# Patient Record
Sex: FEMALE | Race: WHITE | NOT HISPANIC OR LATINO | Employment: OTHER | URBAN - METROPOLITAN AREA
[De-identification: names, ages, dates, MRNs, and addresses within clinical notes are randomized per-mention and may not be internally consistent; named-entity substitution may affect disease eponyms.]

---

## 2017-05-31 ENCOUNTER — APPOINTMENT (OUTPATIENT)
Dept: PREADMISSION TESTING | Facility: HOSPITAL | Age: 82
End: 2017-05-31

## 2017-05-31 VITALS
DIASTOLIC BLOOD PRESSURE: 70 MMHG | SYSTOLIC BLOOD PRESSURE: 147 MMHG | HEIGHT: 62 IN | RESPIRATION RATE: 20 BRPM | WEIGHT: 131 LBS | TEMPERATURE: 96.9 F | OXYGEN SATURATION: 99 % | HEART RATE: 63 BPM | BODY MASS INDEX: 24.11 KG/M2

## 2017-05-31 LAB
ANION GAP SERPL CALCULATED.3IONS-SCNC: 12.7 MMOL/L
BUN BLD-MCNC: 16 MG/DL (ref 8–23)
BUN/CREAT SERPL: 17.2 (ref 7–25)
CALCIUM SPEC-SCNC: 9.3 MG/DL (ref 8.6–10.5)
CHLORIDE SERPL-SCNC: 101 MMOL/L (ref 98–107)
CO2 SERPL-SCNC: 26.3 MMOL/L (ref 22–29)
CREAT BLD-MCNC: 0.93 MG/DL (ref 0.57–1)
DEPRECATED RDW RBC AUTO: 48.8 FL (ref 37–54)
ERYTHROCYTE [DISTWIDTH] IN BLOOD BY AUTOMATED COUNT: 14.8 % (ref 11.7–13)
GFR SERPL CREATININE-BSD FRML MDRD: 58 ML/MIN/1.73
GLUCOSE BLD-MCNC: 82 MG/DL (ref 65–99)
HCT VFR BLD AUTO: 36.4 % (ref 35.6–45.5)
HGB BLD-MCNC: 11.9 G/DL (ref 11.9–15.5)
MCH RBC QN AUTO: 29.5 PG (ref 26.9–32)
MCHC RBC AUTO-ENTMCNC: 32.7 G/DL (ref 32.4–36.3)
MCV RBC AUTO: 90.3 FL (ref 80.5–98.2)
PLATELET # BLD AUTO: 173 10*3/MM3 (ref 140–500)
PMV BLD AUTO: 10.8 FL (ref 6–12)
POTASSIUM BLD-SCNC: 4.6 MMOL/L (ref 3.5–5.2)
RBC # BLD AUTO: 4.03 10*6/MM3 (ref 3.9–5.2)
SODIUM BLD-SCNC: 140 MMOL/L (ref 136–145)
WBC NRBC COR # BLD: 5.77 10*3/MM3 (ref 4.5–10.7)

## 2017-05-31 PROCEDURE — 93010 ELECTROCARDIOGRAM REPORT: CPT | Performed by: INTERNAL MEDICINE

## 2017-05-31 PROCEDURE — 85027 COMPLETE CBC AUTOMATED: CPT | Performed by: OBSTETRICS & GYNECOLOGY

## 2017-05-31 PROCEDURE — 36415 COLL VENOUS BLD VENIPUNCTURE: CPT

## 2017-05-31 PROCEDURE — 80048 BASIC METABOLIC PNL TOTAL CA: CPT | Performed by: OBSTETRICS & GYNECOLOGY

## 2017-05-31 PROCEDURE — 93005 ELECTROCARDIOGRAM TRACING: CPT

## 2017-05-31 RX ORDER — POTASSIUM CHLORIDE 750 MG/1
10 TABLET, FILM COATED, EXTENDED RELEASE ORAL AS NEEDED
COMMUNITY
End: 2020-09-04 | Stop reason: SDUPTHER

## 2017-05-31 RX ORDER — ATORVASTATIN CALCIUM 20 MG/1
20 TABLET, FILM COATED ORAL NIGHTLY
COMMUNITY
End: 2020-02-18 | Stop reason: SDDI

## 2017-06-06 ENCOUNTER — ANESTHESIA (OUTPATIENT)
Dept: PERIOP | Facility: HOSPITAL | Age: 82
End: 2017-06-06

## 2017-06-06 ENCOUNTER — ANESTHESIA EVENT (OUTPATIENT)
Dept: PERIOP | Facility: HOSPITAL | Age: 82
End: 2017-06-06

## 2017-06-06 ENCOUNTER — HOSPITAL ENCOUNTER (OUTPATIENT)
Facility: HOSPITAL | Age: 82
Setting detail: HOSPITAL OUTPATIENT SURGERY
Discharge: HOME | End: 2017-06-06
Attending: OBSTETRICS & GYNECOLOGY | Admitting: OBSTETRICS & GYNECOLOGY

## 2017-06-06 VITALS
SYSTOLIC BLOOD PRESSURE: 142 MMHG | WEIGHT: 129.31 LBS | DIASTOLIC BLOOD PRESSURE: 67 MMHG | RESPIRATION RATE: 16 BRPM | TEMPERATURE: 97.9 F | BODY MASS INDEX: 22.91 KG/M2 | HEART RATE: 69 BPM | HEIGHT: 63 IN | OXYGEN SATURATION: 100 %

## 2017-06-06 DIAGNOSIS — N71.0: ICD-10-CM

## 2017-06-06 PROCEDURE — 25010000002 DEXAMETHASONE PER 1 MG: Performed by: NURSE ANESTHETIST, CERTIFIED REGISTERED

## 2017-06-06 PROCEDURE — 25010000002 MIDAZOLAM PER 1 MG

## 2017-06-06 PROCEDURE — 25010000002 ONDANSETRON PER 1 MG: Performed by: NURSE ANESTHETIST, CERTIFIED REGISTERED

## 2017-06-06 PROCEDURE — 25010000002 FENTANYL CITRATE (PF) 100 MCG/2ML SOLUTION

## 2017-06-06 PROCEDURE — 25010000002 FENTANYL CITRATE (PF) 100 MCG/2ML SOLUTION: Performed by: NURSE ANESTHETIST, CERTIFIED REGISTERED

## 2017-06-06 PROCEDURE — 88305 TISSUE EXAM BY PATHOLOGIST: CPT | Performed by: OBSTETRICS & GYNECOLOGY

## 2017-06-06 RX ORDER — MIDAZOLAM HYDROCHLORIDE 1 MG/ML
INJECTION INTRAMUSCULAR; INTRAVENOUS
Status: COMPLETED
Start: 2017-06-06 | End: 2017-06-06

## 2017-06-06 RX ORDER — PROMETHAZINE HYDROCHLORIDE 25 MG/1
25 SUPPOSITORY RECTAL ONCE AS NEEDED
Status: DISCONTINUED | OUTPATIENT
Start: 2017-06-06 | End: 2017-06-06 | Stop reason: HOSPADM

## 2017-06-06 RX ORDER — LABETALOL HYDROCHLORIDE 5 MG/ML
5 INJECTION, SOLUTION INTRAVENOUS
Status: DISCONTINUED | OUTPATIENT
Start: 2017-06-06 | End: 2017-06-06 | Stop reason: HOSPADM

## 2017-06-06 RX ORDER — FENTANYL CITRATE 50 UG/ML
50 INJECTION, SOLUTION INTRAMUSCULAR; INTRAVENOUS
Status: DISCONTINUED | OUTPATIENT
Start: 2017-06-06 | End: 2017-06-06 | Stop reason: HOSPADM

## 2017-06-06 RX ORDER — PROMETHAZINE HYDROCHLORIDE 25 MG/1
25 TABLET ORAL ONCE AS NEEDED
Status: DISCONTINUED | OUTPATIENT
Start: 2017-06-06 | End: 2017-06-06 | Stop reason: HOSPADM

## 2017-06-06 RX ORDER — PROMETHAZINE HYDROCHLORIDE 25 MG/ML
12.5 INJECTION, SOLUTION INTRAMUSCULAR; INTRAVENOUS ONCE AS NEEDED
Status: DISCONTINUED | OUTPATIENT
Start: 2017-06-06 | End: 2017-06-06 | Stop reason: HOSPADM

## 2017-06-06 RX ORDER — FAMOTIDINE 10 MG/ML
20 INJECTION, SOLUTION INTRAVENOUS ONCE
Status: COMPLETED | OUTPATIENT
Start: 2017-06-06 | End: 2017-06-06

## 2017-06-06 RX ORDER — FAMOTIDINE 10 MG/ML
INJECTION, SOLUTION INTRAVENOUS
Status: COMPLETED
Start: 2017-06-06 | End: 2017-06-06

## 2017-06-06 RX ORDER — NALOXONE HCL 0.4 MG/ML
0.2 VIAL (ML) INJECTION AS NEEDED
Status: DISCONTINUED | OUTPATIENT
Start: 2017-06-06 | End: 2017-06-06 | Stop reason: HOSPADM

## 2017-06-06 RX ORDER — FLUMAZENIL 0.1 MG/ML
0.2 INJECTION INTRAVENOUS AS NEEDED
Status: DISCONTINUED | OUTPATIENT
Start: 2017-06-06 | End: 2017-06-06 | Stop reason: HOSPADM

## 2017-06-06 RX ORDER — SODIUM CHLORIDE, SODIUM LACTATE, POTASSIUM CHLORIDE, CALCIUM CHLORIDE 600; 310; 30; 20 MG/100ML; MG/100ML; MG/100ML; MG/100ML
9 INJECTION, SOLUTION INTRAVENOUS CONTINUOUS
Status: DISCONTINUED | OUTPATIENT
Start: 2017-06-06 | End: 2017-06-06 | Stop reason: HOSPADM

## 2017-06-06 RX ORDER — OXYCODONE AND ACETAMINOPHEN 7.5; 325 MG/1; MG/1
1 TABLET ORAL ONCE AS NEEDED
Status: DISCONTINUED | OUTPATIENT
Start: 2017-06-06 | End: 2017-06-06 | Stop reason: HOSPADM

## 2017-06-06 RX ORDER — HYDRALAZINE HYDROCHLORIDE 20 MG/ML
5 INJECTION INTRAMUSCULAR; INTRAVENOUS
Status: DISCONTINUED | OUTPATIENT
Start: 2017-06-06 | End: 2017-06-06 | Stop reason: HOSPADM

## 2017-06-06 RX ORDER — MAGNESIUM HYDROXIDE 1200 MG/15ML
LIQUID ORAL AS NEEDED
Status: DISCONTINUED | OUTPATIENT
Start: 2017-06-06 | End: 2017-06-06 | Stop reason: HOSPADM

## 2017-06-06 RX ORDER — MIDAZOLAM HYDROCHLORIDE 1 MG/ML
1 INJECTION INTRAMUSCULAR; INTRAVENOUS
Status: DISCONTINUED | OUTPATIENT
Start: 2017-06-06 | End: 2017-06-06 | Stop reason: HOSPADM

## 2017-06-06 RX ORDER — FENTANYL CITRATE 50 UG/ML
INJECTION, SOLUTION INTRAMUSCULAR; INTRAVENOUS
Status: COMPLETED
Start: 2017-06-06 | End: 2017-06-06

## 2017-06-06 RX ORDER — ONDANSETRON 2 MG/ML
4 INJECTION INTRAMUSCULAR; INTRAVENOUS ONCE AS NEEDED
Status: DISCONTINUED | OUTPATIENT
Start: 2017-06-06 | End: 2017-06-06 | Stop reason: HOSPADM

## 2017-06-06 RX ORDER — DIPHENHYDRAMINE HYDROCHLORIDE 50 MG/ML
12.5 INJECTION INTRAMUSCULAR; INTRAVENOUS
Status: DISCONTINUED | OUTPATIENT
Start: 2017-06-06 | End: 2017-06-06 | Stop reason: HOSPADM

## 2017-06-06 RX ORDER — DEXAMETHASONE SODIUM PHOSPHATE 10 MG/ML
INJECTION INTRAMUSCULAR; INTRAVENOUS AS NEEDED
Status: DISCONTINUED | OUTPATIENT
Start: 2017-06-06 | End: 2017-06-06 | Stop reason: SURG

## 2017-06-06 RX ORDER — ONDANSETRON 2 MG/ML
INJECTION INTRAMUSCULAR; INTRAVENOUS AS NEEDED
Status: DISCONTINUED | OUTPATIENT
Start: 2017-06-06 | End: 2017-06-06 | Stop reason: SURG

## 2017-06-06 RX ORDER — SODIUM CHLORIDE 0.9 % (FLUSH) 0.9 %
1-10 SYRINGE (ML) INJECTION AS NEEDED
Status: DISCONTINUED | OUTPATIENT
Start: 2017-06-06 | End: 2017-06-06 | Stop reason: HOSPADM

## 2017-06-06 RX ORDER — HYDROCODONE BITARTRATE AND ACETAMINOPHEN 7.5; 325 MG/1; MG/1
1 TABLET ORAL ONCE AS NEEDED
Status: DISCONTINUED | OUTPATIENT
Start: 2017-06-06 | End: 2017-06-06 | Stop reason: HOSPADM

## 2017-06-06 RX ORDER — EPHEDRINE SULFATE 50 MG/ML
5 INJECTION, SOLUTION INTRAVENOUS ONCE AS NEEDED
Status: DISCONTINUED | OUTPATIENT
Start: 2017-06-06 | End: 2017-06-06 | Stop reason: HOSPADM

## 2017-06-06 RX ORDER — PROMETHAZINE HYDROCHLORIDE 25 MG/1
12.5 TABLET ORAL ONCE AS NEEDED
Status: DISCONTINUED | OUTPATIENT
Start: 2017-06-06 | End: 2017-06-06 | Stop reason: HOSPADM

## 2017-06-06 RX ORDER — MIDAZOLAM HYDROCHLORIDE 1 MG/ML
2 INJECTION INTRAMUSCULAR; INTRAVENOUS
Status: DISCONTINUED | OUTPATIENT
Start: 2017-06-06 | End: 2017-06-06 | Stop reason: HOSPADM

## 2017-06-06 RX ADMIN — MIDAZOLAM 0.5 MG: 1 INJECTION INTRAMUSCULAR; INTRAVENOUS at 12:28

## 2017-06-06 RX ADMIN — FAMOTIDINE 20 MG: 10 INJECTION, SOLUTION INTRAVENOUS at 12:29

## 2017-06-06 RX ADMIN — DEXAMETHASONE SODIUM PHOSPHATE 6 MG: 10 INJECTION INTRAMUSCULAR; INTRAVENOUS at 14:42

## 2017-06-06 RX ADMIN — FENTANYL CITRATE 50 MCG: 50 INJECTION, SOLUTION INTRAMUSCULAR; INTRAVENOUS at 15:55

## 2017-06-06 RX ADMIN — SODIUM CHLORIDE, POTASSIUM CHLORIDE, SODIUM LACTATE AND CALCIUM CHLORIDE 9 ML/HR: 600; 310; 30; 20 INJECTION, SOLUTION INTRAVENOUS at 12:28

## 2017-06-06 RX ADMIN — FENTANYL CITRATE 25 MCG: 50 INJECTION, SOLUTION INTRAMUSCULAR; INTRAVENOUS at 15:45

## 2017-06-06 RX ADMIN — ONDANSETRON 4 MG: 2 INJECTION INTRAMUSCULAR; INTRAVENOUS at 14:42

## 2017-06-06 RX ADMIN — MIDAZOLAM HYDROCHLORIDE 0.5 MG: 1 INJECTION INTRAMUSCULAR; INTRAVENOUS at 12:28

## 2017-06-06 NOTE — OP NOTE
Date of Surgery:  June 6, 2017    Surgeon:  Andres Lambert MD    Assistants:    1.  Yamila Chen MD (Fellow)  2.  Billy Mccullough (medical student)    Preoperative Diagnosis:  Acute pyometrea    Postoperative Diagnosis:  Atrophic endometrial cavity    Procedures performed:  1.  Diagnostic hysteroscopy  2.  Dilation and curettage    Anesthesia:  Laryngeal mask    Estimated blood loss:  5 mL    Urine Output:  150 mL    Fluids:  650 mL    Findings:    1. Vulvovaginal atrophy  2. Size 2 Gelhorn pessary in the vagina  3. Vaginal erosion at the right vaginal fornix  4. Cervical ecchymosis   5. 3 mm urethral caruncle  6. Vaginal petechia  7. Atrophic endometrium without any mass or foreign material in the uterus and no purulent material visualized or obtain with curettings    Specimen:  Endometrial curettings    Implants:  None    Complications:  None apparent    Disposition:  Extubated and transferred to recovery in stable condition.    Indications for procedure:  83-year-old female who had postmenopausal bleeding and subsequently had an endometrial biopsy, which showed acute purulent material.    Description of the procedure:  Patient's consent reviewed in preop area.  No preoperative antibiotic was indicated for this procedure.  Sequential compresion devices were on and running prior to induction of anesthesia. Time out was performed in OR and patient underwent induction of anesthesia and laryngeal mask was placed without difficulty and was placed in the mid dorsal lithotomy position with legs in Candy cane stirrups and care taken not to overflex or overextend hips and shoulders.     Operative technique:  The lateral speculum was then inserted into the vagina.  The anterior lip of the cervix was visualized and grasped using a single-tooth tenaculum.  The cervix was adequately dilated using Shabazz dilators for the introduction of the hysteroscope.  The lateral speculum was removed.  The hysteroscope was introduced  under direct visulization using normal saline solution as the distending medium.  The hysteroscope was advance to the fundus and the entire uterine cavity was surveyed with the findings noted above.  The hysteroscope was then removed and a sharp curetting was performed.  The was done by starting at the 12 o'clock position and rotating a total of 360 degrees in order to cover all surfaces of the endometrial cavity.  Endometrial tissue was obtained and was sent to pathology.  Following the curetting, good hemostasis was noted.  The single-tooth tenaculum was removed from the anterior lip of the cervix and hemostasis was also noted at the tenaculum puncture sites.      At the end of the procedure, all needle, sponge, and instrument counts were noted to be correct x 2.  The patient tolerated the procedure well.  She was returned to supine position.  The laryngeal mask was removed.  She was transferred to the recovery room in stable condition.  Dr. Andres Lambert was present and participated in the entire procedure.

## 2017-06-06 NOTE — PLAN OF CARE
Problem: Patient Care Overview (Adult)  Goal: Plan of Care Review  Outcome: Outcome(s) achieved Date Met:  06/06/17 06/06/17 1739   Coping/Psychosocial Response Interventions   Plan Of Care Reviewed With patient;kathi   Patient Care Overview   Progress improving   Outcome Evaluation   Outcome Summary/Follow up Plan ready for discharge

## 2017-06-06 NOTE — BRIEF OP NOTE
DILATATION AND CURETTAGE HYSTEROSCOPY  Procedure Note    Isabel Jacques  6/6/2017    Pre-op Diagnosis:   Acute pyometrea    Post-op Diagnosis:     Acute pyometrea    Procedure/CPT® Codes:      Procedure(s):  DILATATION AND CURETTAGE HYSTEROSCOPY    Surgeon(s):  Andres Lambert MD    Anesthesia: General    Staff:   Circulator: Nena Salazar RN  Scrub Person: Silvano Adames    Estimated Blood Loss: 5 mL  Urine Voided: 150 mL    Specimens:                  ID Type Source Tests Collected by Time Destination   A :  Tissue Endometrial Curettings TISSUE EXAM Andres Lambert MD 6/6/2017 1507          Drains:    None       Findings:   1.  Vulvovaginal atrophy  2.  Size 2 Gelhorn pessary  3.  Vaginal erosion at the right vaginal fornix  4.  Cervical ecchymosis   5.  3 mm urethral caruncle  6.  Vaginal petechia  7.  Atrophy endometrium without any mass or foreign material in the uterus    Complications: None apparent      Yamila Chen MD     Date: 6/6/2017  Time: 3:19 PM

## 2017-06-06 NOTE — ANESTHESIA PROCEDURE NOTES
Airway  Urgency: elective    Date/Time: 6/6/2017 2:29 PM  Airway not difficult    General Information and Staff    Patient location during procedure: OR  Anesthesiologist: LILIYA JOYCE  CRNA: MILVIA FALCON    Indications and Patient Condition  Indications for airway management: airway protection    Preoxygenated: yes  Mask difficulty assessment: 1 - vent by mask    Final Airway Details  Final airway type: supraglottic airway      Successful airway: classic  Size 4    Number of attempts at approach: 1    Additional Comments  Smooth IV induction. LMA inserted with ease. Cuff up. LMA secured BEBS

## 2017-06-06 NOTE — PLAN OF CARE
Problem: Patient Care Overview (Adult)  Goal: Discharge Needs Assessment  Outcome: Outcome(s) achieved Date Met:  06/06/17 06/06/17 8048   Discharge Needs Assessment   Concerns To Be Addressed no discharge needs identified

## 2017-06-06 NOTE — H&P
Chief complaint:  Purulent material in the uterus    History of present illness:  83-year-old with acute purulent material on endometrial biopsy.    Review of Systems - General ROS: negative  Psychological ROS: negative  Respiratory ROS: no cough, shortness of breath, or wheezing  Cardiovascular ROS: no chest pain or dyspnea on exertion  Gastrointestinal ROS: no abdominal pain, change in bowel habits, or black or bloody stools  negative  Genito-Urinary ROS: no dysuria, trouble voiding, or hematuria  Musculoskeletal ROS: negative  Neurological ROS: no TIA or stroke symptoms    Past Medical History:   Diagnosis Date   • Atrial fibrillation and flutter    • Cellulitis     LOWER EXTREMITIES   • Female bladder prolapse    • High cholesterol    • History of cellulitis    • History of UTI    • Hypertension    • Irregular heart beat    • Macular degeneration    • Presence of pessary    • Vagina bleeding    • Vaginal prolapse      Past Surgical History:   Procedure Laterality Date   • CARDIAC ABLATION     • CATARACT EXTRACTION Bilateral    • CHOLECYSTECTOMY       Scheduled Meds:   Continuous Infusions:  lactated ringers 9 mL/hr Last Rate: 9 mL/hr (06/06/17 1228)     PRN Meds:.•  fentanyl  •  midazolam **OR** midazolam  •  sodium chloride    Allergies   Allergen Reactions   • Stadol [Butorphanol] Shortness Of Breath     Family History   Problem Relation Age of Onset   • Malig Hyperthermia Neg Hx      Social History     Social History   • Marital status:      Spouse name: N/A   • Number of children: N/A   • Years of education: N/A     Occupational History   • Not on file.     Social History Main Topics   • Smoking status: Never Smoker   • Smokeless tobacco: Never Used   • Alcohol use No   • Drug use: No   • Sexual activity: Defer     Other Topics Concern   • Not on file     Social History Narrative     Physical exam    General:  NAD  Head:  Atraumatic and normocephalic  Neck:  Supple  Lungs:  Non-labored  respirations  Heart:  RRR  Abdomen:  Soft, non-tender and non-distended  :  Deferred  Extremity:  No edema in lower extremity  Neuro:  AAO x 3    Assessment and plan    83-year-ol who had acute purulent material on endometrial biopsy    Plan for hysteroscopy D&C  SCDs  Verify consent

## 2017-06-06 NOTE — PLAN OF CARE
Problem: Perioperative Period (Adult)  Goal: Signs and Symptoms of Listed Potential Problems Will be Absent or Manageable (Perioperative Period)  Outcome: Outcome(s) achieved Date Met:  06/06/17 06/06/17 2154   Perioperative Period   Problems Assessed (Perioperative Period) all   Problems Present (Perioperative Period) none

## 2017-06-06 NOTE — ANESTHESIA PREPROCEDURE EVALUATION
Anesthesia Evaluation     Patient summary reviewed and Nursing notes reviewed   no history of anesthetic complications:  NPO Solid Status: > 8 hours       Airway   Mallampati: II  TM distance: >3 FB  Neck ROM: full  Dental    (+) upper dentures and lower dentures    Comment: Lower partial, upper full    Pulmonary - negative pulmonary ROS and normal exam   Cardiovascular - normal exam  Exercise tolerance: good (4-7 METS)    ECG reviewed  Rhythm: regular    (+) hypertension, dysrhythmias Atrial Fib, hyperlipidemia    ROS comment: SINUS RHYTHM  ATRIAL PREMATURE COMPLEX  NONSPECIFIC T ABNORMALITIES, INFERIOR LEADS  NO SIGNIFICANT CHANGE FROM PREVIOUS ECG  Electronically Signed by:  Drew Allan ([facility]) 31-May-2017 20:23:48  Date and Time of Study: 2017-05-31 16:16:24    Neuro/Psych- negative ROS  GI/Hepatic/Renal/Endo - negative ROS     Musculoskeletal (-) negative ROS    Abdominal  - normal exam    Bowel sounds: normal.   Substance History - negative use     OB/GYN negative ob/gyn ROS         Other                                        Anesthesia Plan    ASA 3     general     intravenous induction   Anesthetic plan and risks discussed with patient.

## 2017-06-06 NOTE — PLAN OF CARE
Problem: Patient Care Overview (Adult)  Goal: Adult Individualization and Mutuality  Outcome: Outcome(s) achieved Date Met:  06/06/17

## 2017-06-06 NOTE — PLAN OF CARE
Problem: Patient Care Overview (Adult)  Goal: Plan of Care Review  Outcome: Ongoing (interventions implemented as appropriate)    06/06/17 1156   Coping/Psychosocial Response Interventions   Plan Of Care Reviewed With patient;son   Patient Care Overview   Progress progress toward functional goals as expected       Goal: Adult Individualization and Mutuality  Outcome: Ongoing (interventions implemented as appropriate)  Goal: Discharge Needs Assessment  Outcome: Ongoing (interventions implemented as appropriate)    Problem: Perioperative Period (Adult)  Goal: Signs and Symptoms of Listed Potential Problems Will be Absent or Manageable (Perioperative Period)  Outcome: Ongoing (interventions implemented as appropriate)    06/06/17 1156   Perioperative Period   Problems Assessed (Perioperative Period) all

## 2017-06-07 LAB
CYTO UR: NORMAL
LAB AP CASE REPORT: NORMAL
Lab: NORMAL
PATH REPORT.FINAL DX SPEC: NORMAL
PATH REPORT.GROSS SPEC: NORMAL

## 2018-09-17 ENCOUNTER — APPOINTMENT (OUTPATIENT)
Dept: PREADMISSION TESTING | Facility: HOSPITAL | Age: 83
End: 2018-09-17

## 2018-09-17 VITALS
RESPIRATION RATE: 16 BRPM | OXYGEN SATURATION: 99 % | TEMPERATURE: 98.2 F | BODY MASS INDEX: 24.57 KG/M2 | SYSTOLIC BLOOD PRESSURE: 147 MMHG | WEIGHT: 138.7 LBS | DIASTOLIC BLOOD PRESSURE: 78 MMHG | HEIGHT: 63 IN | HEART RATE: 80 BPM

## 2018-09-17 LAB
ANION GAP SERPL CALCULATED.3IONS-SCNC: 10.9 MMOL/L
BUN BLD-MCNC: 18 MG/DL (ref 8–23)
BUN/CREAT SERPL: 19.8 (ref 7–25)
CALCIUM SPEC-SCNC: 9.1 MG/DL (ref 8.6–10.5)
CHLORIDE SERPL-SCNC: 103 MMOL/L (ref 98–107)
CO2 SERPL-SCNC: 26.1 MMOL/L (ref 22–29)
CREAT BLD-MCNC: 0.91 MG/DL (ref 0.57–1)
DEPRECATED RDW RBC AUTO: 46.6 FL (ref 37–54)
ERYTHROCYTE [DISTWIDTH] IN BLOOD BY AUTOMATED COUNT: 14.1 % (ref 11.7–13)
GFR SERPL CREATININE-BSD FRML MDRD: 59 ML/MIN/1.73
GLUCOSE BLD-MCNC: 90 MG/DL (ref 65–99)
HCT VFR BLD AUTO: 38.2 % (ref 35.6–45.5)
HGB BLD-MCNC: 12.7 G/DL (ref 11.9–15.5)
MCH RBC QN AUTO: 29.7 PG (ref 26.9–32)
MCHC RBC AUTO-ENTMCNC: 33.2 G/DL (ref 32.4–36.3)
MCV RBC AUTO: 89.5 FL (ref 80.5–98.2)
PLATELET # BLD AUTO: 154 10*3/MM3 (ref 140–500)
PMV BLD AUTO: 11.2 FL (ref 6–12)
POTASSIUM BLD-SCNC: 4.3 MMOL/L (ref 3.5–5.2)
RBC # BLD AUTO: 4.27 10*6/MM3 (ref 3.9–5.2)
SODIUM BLD-SCNC: 140 MMOL/L (ref 136–145)
WBC NRBC COR # BLD: 4.77 10*3/MM3 (ref 4.5–10.7)

## 2018-09-17 PROCEDURE — 80048 BASIC METABOLIC PNL TOTAL CA: CPT | Performed by: OBSTETRICS & GYNECOLOGY

## 2018-09-17 PROCEDURE — 85027 COMPLETE CBC AUTOMATED: CPT | Performed by: OBSTETRICS & GYNECOLOGY

## 2018-09-17 PROCEDURE — 36415 COLL VENOUS BLD VENIPUNCTURE: CPT

## 2018-09-17 NOTE — DISCHARGE INSTRUCTIONS
Take the following medications the morning of surgery with a small sip of water: NONE        General Instructions:  • Do not eat or drink anything after midnight the night before surgery.  • Bring any papers given to you in the doctor’s office.  • Wear clean comfortable clothes and socks.  • Do not wear contact lenses or make-up.  Bring a case for your glasses.   • Remove all piercings.  Leave jewelry and any other valuables at home.  • The Pre-Admission Testing nurse will instruct you to bring medications if unable to obtain an accurate list in Pre-Admission Testing.        If you were given a blood bank ID arm band remember to bring it with you the day of surgery.    Preventing a Surgical Site Infection:  • For 2 to 3 days before surgery, avoid shaving with a razor because the razor can irritate skin and make it easier to develop an infection.    • Any areas of open skin can increase the risk of a post-operative wound infection by allowing bacteria to enter and travel throughout the body.  Notify your surgeon if you have any skin wounds / rashes even if it is not near the expected surgical site.  The area will need assessed to determine if surgery should be delayed until it is healed.  • The night prior to surgery sleep in a clean bed with clean clothing.  Do not allow pets to sleep with you.  • Shower on the morning of surgery using a fresh bar of anti-bacterial soap (such as Dial) and clean washcloth.  Dry with a clean towel and dress in clean clothing.  • Ask your surgeon if you will be receiving antibiotics prior to surgery.  • Make sure you, your family, and all healthcare providers clean their hands with soap and water or an alcohol based hand  before caring for you or your wound.    Day of surgery: 9/27/2018. MAIN OR. ARRIVAL TIME 530 AM  Upon arrival, a Pre-op nurse and Anesthesiologist will review your health history, obtain vital signs, and answer questions you may have.  The only belongings  needed at this time will be your home medications and if applicable your C-PAP/BI-PAP machine.  If you are staying overnight your family can leave the rest of your belongings in the car and bring them to your room later.  A Pre-op nurse will start an IV and you may receive medication in preparation for surgery, including something to help you relax.  Your family will be able to see you in the Pre-op area.  While you are in surgery your family should notify the waiting room  if they leave the waiting room area and provide a contact phone number.    Please be aware that surgery does come with discomfort.  We want to make every effort to control your discomfort so please discuss any uncontrolled symptoms with your nurse.   Your doctor will most likely have prescribed pain medications.          If you are staying overnight following surgery, you will be transported to your hospital room following the recovery period.  [facility] has all private rooms.    You have received a list of surgical assistants for your reference.  If you have any questions please call Pre-Admission Testing at [phone].  Deductibles and co-payments are collected on the day of service. Please be prepared to pay the required co-pay, deductible or deposit on the day of service as defined by your plan.

## 2018-09-27 ENCOUNTER — ANESTHESIA EVENT (OUTPATIENT)
Dept: PERIOP | Facility: HOSPITAL | Age: 83
End: 2018-09-27

## 2018-09-27 ENCOUNTER — HOSPITAL ENCOUNTER (OUTPATIENT)
Facility: HOSPITAL | Age: 83
Discharge: HOME | End: 2018-09-29
Attending: OBSTETRICS & GYNECOLOGY | Admitting: STUDENT IN AN ORGANIZED HEALTH CARE EDUCATION/TRAINING PROGRAM

## 2018-09-27 ENCOUNTER — ANESTHESIA (OUTPATIENT)
Dept: PERIOP | Facility: HOSPITAL | Age: 83
End: 2018-09-27

## 2018-09-27 DIAGNOSIS — N81.89 PELVIC FLOOR RELAXATION: ICD-10-CM

## 2018-09-27 PROBLEM — N81.4 UTEROVAGINAL PROLAPSE: Status: ACTIVE | Noted: 2018-09-27

## 2018-09-27 PROCEDURE — A9270 NON-COVERED ITEM OR SERVICE: HCPCS | Performed by: STUDENT IN AN ORGANIZED HEALTH CARE EDUCATION/TRAINING PROGRAM

## 2018-09-27 PROCEDURE — G0378 HOSPITAL OBSERVATION PER HR: HCPCS

## 2018-09-27 PROCEDURE — 25010000002 FENTANYL CITRATE (PF) 100 MCG/2ML SOLUTION: Performed by: ANESTHESIOLOGY

## 2018-09-27 PROCEDURE — 63710000001 TRAMADOL 50 MG TABLET: Performed by: STUDENT IN AN ORGANIZED HEALTH CARE EDUCATION/TRAINING PROGRAM

## 2018-09-27 PROCEDURE — 25010000002 EPINEPHRINE PER 0.1 MG: Performed by: OBSTETRICS & GYNECOLOGY

## 2018-09-27 PROCEDURE — 25010000002 FENTANYL CITRATE (PF) 100 MCG/2ML SOLUTION: Performed by: NURSE ANESTHETIST, CERTIFIED REGISTERED

## 2018-09-27 PROCEDURE — 25010000002 MIDAZOLAM PER 1 MG: Performed by: ANESTHESIOLOGY

## 2018-09-27 PROCEDURE — 25010000002 PROPOFOL 10 MG/ML EMULSION: Performed by: NURSE ANESTHETIST, CERTIFIED REGISTERED

## 2018-09-27 PROCEDURE — 25010000002 ONDANSETRON PER 1 MG: Performed by: NURSE ANESTHETIST, CERTIFIED REGISTERED

## 2018-09-27 PROCEDURE — 25010000002 CEFOXITIN PER 1 G: Performed by: STUDENT IN AN ORGANIZED HEALTH CARE EDUCATION/TRAINING PROGRAM

## 2018-09-27 PROCEDURE — 63710000001 ATORVASTATIN 20 MG TABLET: Performed by: STUDENT IN AN ORGANIZED HEALTH CARE EDUCATION/TRAINING PROGRAM

## 2018-09-27 PROCEDURE — 88305 TISSUE EXAM BY PATHOLOGIST: CPT | Performed by: OBSTETRICS & GYNECOLOGY

## 2018-09-27 PROCEDURE — 25010000002 DEXAMETHASONE PER 1 MG: Performed by: NURSE ANESTHETIST, CERTIFIED REGISTERED

## 2018-09-27 PROCEDURE — 25010000002 ONDANSETRON PER 1 MG: Performed by: STUDENT IN AN ORGANIZED HEALTH CARE EDUCATION/TRAINING PROGRAM

## 2018-09-27 PROCEDURE — 88307 TISSUE EXAM BY PATHOLOGIST: CPT | Performed by: OBSTETRICS & GYNECOLOGY

## 2018-09-27 PROCEDURE — 25010000002 HYDROMORPHONE PER 4 MG: Performed by: STUDENT IN AN ORGANIZED HEALTH CARE EDUCATION/TRAINING PROGRAM

## 2018-09-27 PROCEDURE — 25010000002 PHENYLEPHRINE PER 1 ML: Performed by: NURSE ANESTHETIST, CERTIFIED REGISTERED

## 2018-09-27 PROCEDURE — 63710000001 DOCUSATE SODIUM 100 MG CAPSULE: Performed by: STUDENT IN AN ORGANIZED HEALTH CARE EDUCATION/TRAINING PROGRAM

## 2018-09-27 PROCEDURE — 25010000002 KETOROLAC TROMETHAMINE PER 15 MG: Performed by: STUDENT IN AN ORGANIZED HEALTH CARE EDUCATION/TRAINING PROGRAM

## 2018-09-27 RX ORDER — FENTANYL CITRATE 50 UG/ML
50 INJECTION, SOLUTION INTRAMUSCULAR; INTRAVENOUS
Status: DISCONTINUED | OUTPATIENT
Start: 2018-09-27 | End: 2018-09-27 | Stop reason: HOSPADM

## 2018-09-27 RX ORDER — DEXAMETHASONE SODIUM PHOSPHATE 10 MG/ML
INJECTION INTRAMUSCULAR; INTRAVENOUS AS NEEDED
Status: DISCONTINUED | OUTPATIENT
Start: 2018-09-27 | End: 2018-09-27 | Stop reason: SURG

## 2018-09-27 RX ORDER — SODIUM CHLORIDE, SODIUM LACTATE, POTASSIUM CHLORIDE, CALCIUM CHLORIDE 600; 310; 30; 20 MG/100ML; MG/100ML; MG/100ML; MG/100ML
9 INJECTION, SOLUTION INTRAVENOUS CONTINUOUS
Status: DISCONTINUED | OUTPATIENT
Start: 2018-09-27 | End: 2018-09-27 | Stop reason: HOSPADM

## 2018-09-27 RX ORDER — FAMOTIDINE 20 MG/1
20 TABLET, FILM COATED ORAL DAILY
Status: DISCONTINUED | OUTPATIENT
Start: 2018-09-28 | End: 2018-09-29 | Stop reason: HOSPADM

## 2018-09-27 RX ORDER — SODIUM CHLORIDE 0.9 % (FLUSH) 0.9 %
1-10 SYRINGE (ML) INJECTION AS NEEDED
Status: DISCONTINUED | OUTPATIENT
Start: 2018-09-27 | End: 2018-09-27 | Stop reason: HOSPADM

## 2018-09-27 RX ORDER — MIDAZOLAM HYDROCHLORIDE 1 MG/ML
1 INJECTION INTRAMUSCULAR; INTRAVENOUS
Status: DISCONTINUED | OUTPATIENT
Start: 2018-09-27 | End: 2018-09-27 | Stop reason: HOSPADM

## 2018-09-27 RX ORDER — ONDANSETRON 2 MG/ML
4 INJECTION INTRAMUSCULAR; INTRAVENOUS ONCE AS NEEDED
Status: COMPLETED | OUTPATIENT
Start: 2018-09-27 | End: 2018-09-27

## 2018-09-27 RX ORDER — PROMETHAZINE HYDROCHLORIDE 25 MG/1
12.5 TABLET ORAL ONCE AS NEEDED
Status: DISCONTINUED | OUTPATIENT
Start: 2018-09-27 | End: 2018-09-27 | Stop reason: HOSPADM

## 2018-09-27 RX ORDER — FLUMAZENIL 0.1 MG/ML
0.2 INJECTION INTRAVENOUS AS NEEDED
Status: DISCONTINUED | OUTPATIENT
Start: 2018-09-27 | End: 2018-09-27 | Stop reason: HOSPADM

## 2018-09-27 RX ORDER — ONDANSETRON 2 MG/ML
4 INJECTION INTRAMUSCULAR; INTRAVENOUS EVERY 6 HOURS PRN
Status: DISCONTINUED | OUTPATIENT
Start: 2018-09-27 | End: 2018-09-29 | Stop reason: HOSPADM

## 2018-09-27 RX ORDER — ACETAMINOPHEN 325 MG/1
650 TABLET ORAL EVERY 4 HOURS PRN
Status: DISCONTINUED | OUTPATIENT
Start: 2018-09-27 | End: 2018-09-29 | Stop reason: HOSPADM

## 2018-09-27 RX ORDER — OXYCODONE AND ACETAMINOPHEN 7.5; 325 MG/1; MG/1
1 TABLET ORAL ONCE AS NEEDED
Status: DISCONTINUED | OUTPATIENT
Start: 2018-09-27 | End: 2018-09-27 | Stop reason: HOSPADM

## 2018-09-27 RX ORDER — PROMETHAZINE HYDROCHLORIDE 25 MG/ML
12.5 INJECTION, SOLUTION INTRAMUSCULAR; INTRAVENOUS ONCE AS NEEDED
Status: DISCONTINUED | OUTPATIENT
Start: 2018-09-27 | End: 2018-09-27 | Stop reason: HOSPADM

## 2018-09-27 RX ORDER — ROCURONIUM BROMIDE 10 MG/ML
INJECTION, SOLUTION INTRAVENOUS AS NEEDED
Status: DISCONTINUED | OUTPATIENT
Start: 2018-09-27 | End: 2018-09-27 | Stop reason: SURG

## 2018-09-27 RX ORDER — EPHEDRINE SULFATE 50 MG/ML
5 INJECTION, SOLUTION INTRAVENOUS ONCE AS NEEDED
Status: DISCONTINUED | OUTPATIENT
Start: 2018-09-27 | End: 2018-09-27 | Stop reason: HOSPADM

## 2018-09-27 RX ORDER — PROPOFOL 10 MG/ML
VIAL (ML) INTRAVENOUS AS NEEDED
Status: DISCONTINUED | OUTPATIENT
Start: 2018-09-27 | End: 2018-09-27 | Stop reason: SURG

## 2018-09-27 RX ORDER — MIDAZOLAM HYDROCHLORIDE 1 MG/ML
2 INJECTION INTRAMUSCULAR; INTRAVENOUS
Status: DISCONTINUED | OUTPATIENT
Start: 2018-09-27 | End: 2018-09-27 | Stop reason: HOSPADM

## 2018-09-27 RX ORDER — DOCUSATE SODIUM 100 MG/1
100 CAPSULE, LIQUID FILLED ORAL 2 TIMES DAILY PRN
Status: DISCONTINUED | OUTPATIENT
Start: 2018-09-27 | End: 2018-09-29 | Stop reason: HOSPADM

## 2018-09-27 RX ORDER — KETOROLAC TROMETHAMINE 30 MG/ML
15 INJECTION, SOLUTION INTRAMUSCULAR; INTRAVENOUS EVERY 6 HOURS
Status: COMPLETED | OUTPATIENT
Start: 2018-09-27 | End: 2018-09-28

## 2018-09-27 RX ORDER — PROMETHAZINE HYDROCHLORIDE 25 MG/1
25 TABLET ORAL ONCE AS NEEDED
Status: DISCONTINUED | OUTPATIENT
Start: 2018-09-27 | End: 2018-09-27 | Stop reason: HOSPADM

## 2018-09-27 RX ORDER — GLYCOPYRROLATE 0.2 MG/ML
INJECTION INTRAMUSCULAR; INTRAVENOUS AS NEEDED
Status: DISCONTINUED | OUTPATIENT
Start: 2018-09-27 | End: 2018-09-27 | Stop reason: SURG

## 2018-09-27 RX ORDER — SODIUM CHLORIDE 9 MG/ML
125 INJECTION, SOLUTION INTRAVENOUS CONTINUOUS
Status: DISCONTINUED | OUTPATIENT
Start: 2018-09-27 | End: 2018-09-29 | Stop reason: HOSPADM

## 2018-09-27 RX ORDER — ONDANSETRON 2 MG/ML
INJECTION INTRAMUSCULAR; INTRAVENOUS AS NEEDED
Status: DISCONTINUED | OUTPATIENT
Start: 2018-09-27 | End: 2018-09-27 | Stop reason: SURG

## 2018-09-27 RX ORDER — NALOXONE HCL 0.4 MG/ML
0.4 VIAL (ML) INJECTION
Status: DISCONTINUED | OUTPATIENT
Start: 2018-09-27 | End: 2018-09-29 | Stop reason: HOSPADM

## 2018-09-27 RX ORDER — HYDROCODONE BITARTRATE AND ACETAMINOPHEN 7.5; 325 MG/1; MG/1
1 TABLET ORAL ONCE AS NEEDED
Status: DISCONTINUED | OUTPATIENT
Start: 2018-09-27 | End: 2018-09-27 | Stop reason: HOSPADM

## 2018-09-27 RX ORDER — FAMOTIDINE 10 MG/ML
20 INJECTION, SOLUTION INTRAVENOUS ONCE
Status: COMPLETED | OUTPATIENT
Start: 2018-09-27 | End: 2018-09-27

## 2018-09-27 RX ORDER — SODIUM CHLORIDE 0.9 % (FLUSH) 0.9 %
1-10 SYRINGE (ML) INJECTION AS NEEDED
Status: DISCONTINUED | OUTPATIENT
Start: 2018-09-27 | End: 2018-09-29 | Stop reason: HOSPADM

## 2018-09-27 RX ORDER — LIDOCAINE HYDROCHLORIDE 10 MG/ML
0.5 INJECTION, SOLUTION EPIDURAL; INFILTRATION; INTRACAUDAL; PERINEURAL ONCE AS NEEDED
Status: DISCONTINUED | OUTPATIENT
Start: 2018-09-27 | End: 2018-09-27 | Stop reason: HOSPADM

## 2018-09-27 RX ORDER — TRAMADOL HYDROCHLORIDE 50 MG/1
50 TABLET ORAL EVERY 6 HOURS PRN
Status: DISCONTINUED | OUTPATIENT
Start: 2018-09-27 | End: 2018-09-27 | Stop reason: SDUPTHER

## 2018-09-27 RX ORDER — TRAMADOL HYDROCHLORIDE 50 MG/1
100 TABLET ORAL EVERY 4 HOURS PRN
Status: DISCONTINUED | OUTPATIENT
Start: 2018-09-27 | End: 2018-09-29 | Stop reason: HOSPADM

## 2018-09-27 RX ORDER — ATORVASTATIN CALCIUM 20 MG/1
20 TABLET, FILM COATED ORAL NIGHTLY
Status: DISCONTINUED | OUTPATIENT
Start: 2018-09-27 | End: 2018-09-29 | Stop reason: HOSPADM

## 2018-09-27 RX ORDER — PROMETHAZINE HYDROCHLORIDE 25 MG/1
25 SUPPOSITORY RECTAL ONCE AS NEEDED
Status: DISCONTINUED | OUTPATIENT
Start: 2018-09-27 | End: 2018-09-27 | Stop reason: HOSPADM

## 2018-09-27 RX ORDER — NALOXONE HCL 0.4 MG/ML
0.2 VIAL (ML) INJECTION AS NEEDED
Status: DISCONTINUED | OUTPATIENT
Start: 2018-09-27 | End: 2018-09-27 | Stop reason: HOSPADM

## 2018-09-27 RX ORDER — DIPHENHYDRAMINE HYDROCHLORIDE 50 MG/ML
12.5 INJECTION INTRAMUSCULAR; INTRAVENOUS
Status: DISCONTINUED | OUTPATIENT
Start: 2018-09-27 | End: 2018-09-27 | Stop reason: HOSPADM

## 2018-09-27 RX ORDER — HYDROMORPHONE HYDROCHLORIDE 1 MG/ML
0.5 INJECTION, SOLUTION INTRAMUSCULAR; INTRAVENOUS; SUBCUTANEOUS
Status: DISCONTINUED | OUTPATIENT
Start: 2018-09-27 | End: 2018-09-29 | Stop reason: HOSPADM

## 2018-09-27 RX ORDER — SODIUM CHLORIDE, SODIUM LACTATE, POTASSIUM CHLORIDE, CALCIUM CHLORIDE 600; 310; 30; 20 MG/100ML; MG/100ML; MG/100ML; MG/100ML
125 INJECTION, SOLUTION INTRAVENOUS CONTINUOUS
Status: DISCONTINUED | OUTPATIENT
Start: 2018-09-27 | End: 2018-09-27 | Stop reason: HOSPADM

## 2018-09-27 RX ORDER — FAMOTIDINE 40 MG/1
40 TABLET, FILM COATED ORAL DAILY
Status: DISCONTINUED | OUTPATIENT
Start: 2018-09-27 | End: 2018-09-27

## 2018-09-27 RX ORDER — LABETALOL HYDROCHLORIDE 5 MG/ML
5 INJECTION, SOLUTION INTRAVENOUS
Status: DISCONTINUED | OUTPATIENT
Start: 2018-09-27 | End: 2018-09-27 | Stop reason: HOSPADM

## 2018-09-27 RX ORDER — ONDANSETRON 4 MG/1
4 TABLET, ORALLY DISINTEGRATING ORAL EVERY 6 HOURS PRN
Status: DISCONTINUED | OUTPATIENT
Start: 2018-09-27 | End: 2018-09-29 | Stop reason: HOSPADM

## 2018-09-27 RX ORDER — DEXTROSE MONOHYDRATE 25 G/50ML
INJECTION, SOLUTION INTRAVENOUS AS NEEDED
Status: DISCONTINUED | OUTPATIENT
Start: 2018-09-27 | End: 2018-09-27 | Stop reason: HOSPADM

## 2018-09-27 RX ORDER — MAGNESIUM HYDROXIDE 1200 MG/15ML
LIQUID ORAL AS NEEDED
Status: DISCONTINUED | OUTPATIENT
Start: 2018-09-27 | End: 2018-09-27 | Stop reason: HOSPADM

## 2018-09-27 RX ORDER — ONDANSETRON 4 MG/1
4 TABLET, FILM COATED ORAL EVERY 6 HOURS PRN
Status: DISCONTINUED | OUTPATIENT
Start: 2018-09-27 | End: 2018-09-29 | Stop reason: HOSPADM

## 2018-09-27 RX ADMIN — FENTANYL CITRATE 50 MCG: 50 INJECTION, SOLUTION INTRAMUSCULAR; INTRAVENOUS at 11:33

## 2018-09-27 RX ADMIN — FENTANYL CITRATE 100 MCG: 50 INJECTION INTRAMUSCULAR; INTRAVENOUS at 07:35

## 2018-09-27 RX ADMIN — ATORVASTATIN CALCIUM 20 MG: 20 TABLET, FILM COATED ORAL at 22:35

## 2018-09-27 RX ADMIN — PHENYLEPHRINE HYDROCHLORIDE 100 MCG: 10 INJECTION INTRAVENOUS at 08:05

## 2018-09-27 RX ADMIN — HYDROMORPHONE HYDROCHLORIDE 0.5 MG: 1 INJECTION, SOLUTION INTRAMUSCULAR; INTRAVENOUS; SUBCUTANEOUS at 15:29

## 2018-09-27 RX ADMIN — PROPOFOL 150 MG: 10 INJECTION, EMULSION INTRAVENOUS at 07:35

## 2018-09-27 RX ADMIN — KETOROLAC TROMETHAMINE 15 MG: 30 INJECTION, SOLUTION INTRAMUSCULAR at 17:41

## 2018-09-27 RX ADMIN — SODIUM CHLORIDE, POTASSIUM CHLORIDE, SODIUM LACTATE AND CALCIUM CHLORIDE 9 ML/HR: 600; 310; 30; 20 INJECTION, SOLUTION INTRAVENOUS at 13:17

## 2018-09-27 RX ADMIN — DOCUSATE SODIUM 100 MG: 100 CAPSULE, LIQUID FILLED ORAL at 20:43

## 2018-09-27 RX ADMIN — SODIUM CHLORIDE, POTASSIUM CHLORIDE, SODIUM LACTATE AND CALCIUM CHLORIDE: 600; 310; 30; 20 INJECTION, SOLUTION INTRAVENOUS at 10:40

## 2018-09-27 RX ADMIN — CEFOXITIN SODIUM 2 G: 1 POWDER, FOR SOLUTION INTRAVENOUS at 07:38

## 2018-09-27 RX ADMIN — TRAMADOL HYDROCHLORIDE 100 MG: 50 TABLET, FILM COATED ORAL at 20:43

## 2018-09-27 RX ADMIN — KETOROLAC TROMETHAMINE 15 MG: 30 INJECTION, SOLUTION INTRAMUSCULAR at 12:23

## 2018-09-27 RX ADMIN — ROCURONIUM BROMIDE 10 MG: 10 INJECTION INTRAVENOUS at 10:39

## 2018-09-27 RX ADMIN — SUGAMMADEX 300 MG: 100 INJECTION, SOLUTION INTRAVENOUS at 10:58

## 2018-09-27 RX ADMIN — HYDROMORPHONE HYDROCHLORIDE 0.5 MG: 1 INJECTION, SOLUTION INTRAMUSCULAR; INTRAVENOUS; SUBCUTANEOUS at 23:54

## 2018-09-27 RX ADMIN — ROCURONIUM BROMIDE 40 MG: 10 INJECTION INTRAVENOUS at 07:35

## 2018-09-27 RX ADMIN — MIDAZOLAM HYDROCHLORIDE 1 MG: 2 INJECTION, SOLUTION INTRAMUSCULAR; INTRAVENOUS at 06:43

## 2018-09-27 RX ADMIN — SODIUM CHLORIDE, POTASSIUM CHLORIDE, SODIUM LACTATE AND CALCIUM CHLORIDE 9 ML/HR: 600; 310; 30; 20 INJECTION, SOLUTION INTRAVENOUS at 06:42

## 2018-09-27 RX ADMIN — DEXAMETHASONE SODIUM PHOSPHATE 8 MG: 10 INJECTION INTRAMUSCULAR; INTRAVENOUS at 07:48

## 2018-09-27 RX ADMIN — ONDANSETRON 4 MG: 2 INJECTION INTRAMUSCULAR; INTRAVENOUS at 15:30

## 2018-09-27 RX ADMIN — ROCURONIUM BROMIDE 20 MG: 10 INJECTION INTRAVENOUS at 08:36

## 2018-09-27 RX ADMIN — FAMOTIDINE 20 MG: 10 INJECTION, SOLUTION INTRAVENOUS at 06:42

## 2018-09-27 RX ADMIN — FENTANYL CITRATE 50 MCG: 50 INJECTION, SOLUTION INTRAMUSCULAR; INTRAVENOUS at 12:59

## 2018-09-27 RX ADMIN — SODIUM CHLORIDE 125 ML/HR: 9 INJECTION, SOLUTION INTRAVENOUS at 22:35

## 2018-09-27 RX ADMIN — ONDANSETRON 4 MG: 2 INJECTION INTRAMUSCULAR; INTRAVENOUS at 09:33

## 2018-09-27 RX ADMIN — ONDANSETRON 4 MG: 2 INJECTION, SOLUTION INTRAMUSCULAR; INTRAVENOUS at 11:30

## 2018-09-27 RX ADMIN — SODIUM CHLORIDE 125 ML/HR: 9 INJECTION, SOLUTION INTRAVENOUS at 15:25

## 2018-09-27 RX ADMIN — SODIUM CHLORIDE, POTASSIUM CHLORIDE, SODIUM LACTATE AND CALCIUM CHLORIDE: 600; 310; 30; 20 INJECTION, SOLUTION INTRAVENOUS at 07:29

## 2018-09-27 RX ADMIN — GLYCOPYRROLATE 0.2 MG: 0.2 INJECTION INTRAMUSCULAR; INTRAVENOUS at 08:33

## 2018-09-27 RX ADMIN — FENTANYL CITRATE 50 MCG: 50 INJECTION, SOLUTION INTRAMUSCULAR; INTRAVENOUS at 12:05

## 2018-09-27 NOTE — ANESTHESIA POSTPROCEDURE EVALUATION
Patient: Isabel Jacques    Procedure Summary     Date:  09/27/18 Room / Location:  [facility] OR [address] [place] [address] MAIN OR    Anesthesia Start:  0729 Anesthesia Stop:  1113    Procedure:  TOTAL VAGINAL HYSTERECTOMY UTEROSACRAL LIGAMENT SUSPENSION (N/A Uterus) Diagnosis:      Surgeon:  Andres Lambert MD Provider:  Juan Manuel Dean MD    Anesthesia Type:  Not recorded ASA Status:  3          Anesthesia Type: No value filed.  Last vitals  BP   104/58 (09/27/18 1130)   Temp   36.4 °C (97.6 °F) (09/27/18 1106)   Pulse   80 (09/27/18 1130)   Resp   16 (09/27/18 1130)     SpO2   100 % (09/27/18 1130)     Post Anesthesia Care and Evaluation    Patient location during evaluation: PACU  Patient participation: complete - patient participated  Level of consciousness: awake and alert  Pain management: adequate  Airway patency: patent  Anesthetic complications: No anesthetic complications    Cardiovascular status: acceptable  Respiratory status: acceptable  Hydration status: acceptable    Comments: --------------------            09/27/18               1130     --------------------   BP:       104/58     Pulse:      80       Resp:       16       Temp:                SpO2:      100%     --------------------

## 2018-09-27 NOTE — ANESTHESIA PREPROCEDURE EVALUATION
Anesthesia Evaluation     Patient summary reviewed and Nursing notes reviewed   NPO Solid Status: > 8 hours  NPO Liquid Status: > 2 hours           Airway   Mallampati: II  no difficulty expected  Dental - normal exam   (+) edentulous and upper dentures    Pulmonary     breath sounds clear to auscultation  Cardiovascular     ECG reviewed  Rhythm: regular  Rate: normal    (+) dysrhythmias, hyperlipidemia,       Neuro/Psych  GI/Hepatic/Renal/Endo      Musculoskeletal     Abdominal    Substance History      OB/GYN          Other                        Anesthesia Plan    ASA 3     intravenous and inhalational induction   Anesthetic plan, all risks, benefits, and alternatives have been provided, discussed and informed consent has been obtained with: patient.    In SR now

## 2018-09-28 LAB
ANION GAP SERPL CALCULATED.3IONS-SCNC: 10.2 MMOL/L
BUN BLD-MCNC: 22 MG/DL (ref 8–23)
BUN/CREAT SERPL: 23.9 (ref 7–25)
CALCIUM SPEC-SCNC: 7.7 MG/DL (ref 8.6–10.5)
CHLORIDE SERPL-SCNC: 107 MMOL/L (ref 98–107)
CO2 SERPL-SCNC: 20.8 MMOL/L (ref 22–29)
CREAT BLD-MCNC: 0.92 MG/DL (ref 0.57–1)
GFR SERPL CREATININE-BSD FRML MDRD: 58 ML/MIN/1.73
GLUCOSE BLD-MCNC: 110 MG/DL (ref 65–99)
POTASSIUM BLD-SCNC: 4.8 MMOL/L (ref 3.5–5.2)
SODIUM BLD-SCNC: 138 MMOL/L (ref 136–145)

## 2018-09-28 PROCEDURE — 63710000001 ONDANSETRON PER 8 MG: Performed by: STUDENT IN AN ORGANIZED HEALTH CARE EDUCATION/TRAINING PROGRAM

## 2018-09-28 PROCEDURE — A9270 NON-COVERED ITEM OR SERVICE: HCPCS | Performed by: STUDENT IN AN ORGANIZED HEALTH CARE EDUCATION/TRAINING PROGRAM

## 2018-09-28 PROCEDURE — 63710000001 FAMOTIDINE 20 MG TABLET: Performed by: STUDENT IN AN ORGANIZED HEALTH CARE EDUCATION/TRAINING PROGRAM

## 2018-09-28 PROCEDURE — 63710000001 TRAMADOL 50 MG TABLET: Performed by: STUDENT IN AN ORGANIZED HEALTH CARE EDUCATION/TRAINING PROGRAM

## 2018-09-28 PROCEDURE — 63710000001 ATORVASTATIN 20 MG TABLET: Performed by: STUDENT IN AN ORGANIZED HEALTH CARE EDUCATION/TRAINING PROGRAM

## 2018-09-28 PROCEDURE — G0378 HOSPITAL OBSERVATION PER HR: HCPCS

## 2018-09-28 PROCEDURE — 63710000001 DOCUSATE SODIUM 100 MG CAPSULE: Performed by: STUDENT IN AN ORGANIZED HEALTH CARE EDUCATION/TRAINING PROGRAM

## 2018-09-28 PROCEDURE — 25010000002 ONDANSETRON PER 1 MG: Performed by: STUDENT IN AN ORGANIZED HEALTH CARE EDUCATION/TRAINING PROGRAM

## 2018-09-28 PROCEDURE — 80048 BASIC METABOLIC PNL TOTAL CA: CPT | Performed by: STUDENT IN AN ORGANIZED HEALTH CARE EDUCATION/TRAINING PROGRAM

## 2018-09-28 PROCEDURE — 25010000002 KETOROLAC TROMETHAMINE PER 15 MG: Performed by: STUDENT IN AN ORGANIZED HEALTH CARE EDUCATION/TRAINING PROGRAM

## 2018-09-28 RX ORDER — HYDROMORPHONE HYDROCHLORIDE 2 MG/1
2 TABLET ORAL EVERY 6 HOURS PRN
Qty: 20 TABLET | Refills: 0 | Status: SHIPPED | OUTPATIENT
Start: 2018-09-28 | End: 2019-06-17 | Stop reason: ALTCHOICE

## 2018-09-28 RX ORDER — TRAMADOL HYDROCHLORIDE 50 MG/1
50 TABLET ORAL EVERY 6 HOURS PRN
Qty: 20 TABLET | Refills: 0 | Status: SHIPPED | OUTPATIENT
Start: 2018-09-28 | End: 2019-06-17 | Stop reason: ALTCHOICE

## 2018-09-28 RX ORDER — ONDANSETRON 4 MG/1
4 TABLET, FILM COATED ORAL EVERY 6 HOURS PRN
Qty: 10 TABLET | Refills: 3 | Status: SHIPPED | OUTPATIENT
Start: 2018-09-28 | End: 2019-06-17 | Stop reason: ALTCHOICE

## 2018-09-28 RX ORDER — PSEUDOEPHEDRINE HCL 30 MG
100 TABLET ORAL 2 TIMES DAILY PRN
Qty: 60 CAPSULE | Refills: 3 | Status: SHIPPED | OUTPATIENT
Start: 2018-09-28 | End: 2020-02-18

## 2018-09-28 RX ADMIN — ONDANSETRON 4 MG: 2 INJECTION INTRAMUSCULAR; INTRAVENOUS at 01:27

## 2018-09-28 RX ADMIN — KETOROLAC TROMETHAMINE 15 MG: 30 INJECTION, SOLUTION INTRAMUSCULAR at 01:09

## 2018-09-28 RX ADMIN — FAMOTIDINE 20 MG: 20 TABLET, FILM COATED ORAL at 08:28

## 2018-09-28 RX ADMIN — KETOROLAC TROMETHAMINE 15 MG: 30 INJECTION, SOLUTION INTRAMUSCULAR at 06:48

## 2018-09-28 RX ADMIN — ONDANSETRON 4 MG: 4 TABLET, FILM COATED ORAL at 20:25

## 2018-09-28 RX ADMIN — TRAMADOL HYDROCHLORIDE 100 MG: 50 TABLET, FILM COATED ORAL at 08:28

## 2018-09-28 RX ADMIN — TRAMADOL HYDROCHLORIDE 100 MG: 50 TABLET, FILM COATED ORAL at 12:38

## 2018-09-28 RX ADMIN — DOCUSATE SODIUM 100 MG: 100 CAPSULE, LIQUID FILLED ORAL at 14:55

## 2018-09-28 RX ADMIN — TRAMADOL HYDROCHLORIDE 100 MG: 50 TABLET, FILM COATED ORAL at 20:25

## 2018-09-28 RX ADMIN — SODIUM CHLORIDE 125 ML/HR: 9 INJECTION, SOLUTION INTRAVENOUS at 14:55

## 2018-09-28 RX ADMIN — SODIUM CHLORIDE 125 ML/HR: 9 INJECTION, SOLUTION INTRAVENOUS at 23:04

## 2018-09-28 RX ADMIN — TRAMADOL HYDROCHLORIDE 100 MG: 50 TABLET, FILM COATED ORAL at 16:28

## 2018-09-28 RX ADMIN — ATORVASTATIN CALCIUM 20 MG: 20 TABLET, FILM COATED ORAL at 20:25

## 2018-09-28 RX ADMIN — SODIUM CHLORIDE 500 ML: 9 INJECTION, SOLUTION INTRAVENOUS at 05:48

## 2018-09-28 RX ADMIN — SODIUM CHLORIDE 125 ML/HR: 9 INJECTION, SOLUTION INTRAVENOUS at 06:49

## 2018-09-29 VITALS
DIASTOLIC BLOOD PRESSURE: 67 MMHG | SYSTOLIC BLOOD PRESSURE: 117 MMHG | HEIGHT: 63 IN | OXYGEN SATURATION: 94 % | TEMPERATURE: 98 F | WEIGHT: 140.21 LBS | BODY MASS INDEX: 24.84 KG/M2 | RESPIRATION RATE: 16 BRPM | HEART RATE: 54 BPM

## 2018-09-29 PROCEDURE — A9270 NON-COVERED ITEM OR SERVICE: HCPCS | Performed by: STUDENT IN AN ORGANIZED HEALTH CARE EDUCATION/TRAINING PROGRAM

## 2018-09-29 PROCEDURE — 63710000001 FAMOTIDINE 20 MG TABLET: Performed by: STUDENT IN AN ORGANIZED HEALTH CARE EDUCATION/TRAINING PROGRAM

## 2018-09-29 PROCEDURE — 63710000001 TRAMADOL 50 MG TABLET: Performed by: STUDENT IN AN ORGANIZED HEALTH CARE EDUCATION/TRAINING PROGRAM

## 2018-09-29 PROCEDURE — G0378 HOSPITAL OBSERVATION PER HR: HCPCS

## 2018-09-29 RX ORDER — ACETAMINOPHEN 325 MG/1
650 TABLET ORAL EVERY 4 HOURS PRN
Qty: 100 TABLET | Refills: 3 | Status: SHIPPED | OUTPATIENT
Start: 2018-09-29

## 2018-09-29 RX ADMIN — FAMOTIDINE 20 MG: 20 TABLET, FILM COATED ORAL at 07:52

## 2018-09-29 RX ADMIN — TRAMADOL HYDROCHLORIDE 100 MG: 50 TABLET, FILM COATED ORAL at 06:40

## 2018-09-29 RX ADMIN — TRAMADOL HYDROCHLORIDE 100 MG: 50 TABLET, FILM COATED ORAL at 00:55

## 2018-10-01 LAB
CYTO UR: NORMAL
LAB AP CASE REPORT: NORMAL
PATH REPORT.FINAL DX SPEC: NORMAL
PATH REPORT.GROSS SPEC: NORMAL

## 2018-10-01 NOTE — PROGRESS NOTES
Case Management Discharge Note    Final Note: Discharged home    Destination     No service has been selected for the patient.      Durable Medical Equipment     No service has been selected for the patient.      Dialysis/Infusion     No service has been selected for the patient.      Home Medical Care     No service has been selected for the patient.      Social Care     No service has been selected for the patient.        Other:  (private auto)    Final Discharge Disposition Code: 01 - home or self-care

## 2019-06-14 PROBLEM — Z98.890 STATUS POST CATHETER ABLATION OF ATRIAL FLUTTER: Status: ACTIVE | Noted: 2019-06-14

## 2019-06-14 PROBLEM — I34.0 MITRAL REGURGITATION: Status: ACTIVE | Noted: 2019-06-14

## 2019-06-14 PROBLEM — R06.09 DYSPNEA ON EXERTION: Status: ACTIVE | Noted: 2019-06-14

## 2019-06-14 PROBLEM — I27.20 PULMONARY HYPERTENSION (HCC): Status: ACTIVE | Noted: 2019-06-14

## 2019-06-14 PROBLEM — I48.0 PAROXYSMAL ATRIAL FIBRILLATION: Status: ACTIVE | Noted: 2019-06-14

## 2019-06-14 PROBLEM — I07.1 TRICUSPID REGURGITATION: Status: ACTIVE | Noted: 2019-06-14

## 2019-06-17 ENCOUNTER — OFFICE VISIT (OUTPATIENT)
Dept: CARDIOLOGY | Facility: CLINIC | Age: 84
End: 2019-06-17

## 2019-06-17 VITALS
BODY MASS INDEX: 27.11 KG/M2 | SYSTOLIC BLOOD PRESSURE: 112 MMHG | HEIGHT: 63 IN | DIASTOLIC BLOOD PRESSURE: 68 MMHG | WEIGHT: 153 LBS | HEART RATE: 120 BPM

## 2019-06-17 DIAGNOSIS — I48.0 PAROXYSMAL ATRIAL FIBRILLATION (HCC): ICD-10-CM

## 2019-06-17 DIAGNOSIS — I27.20 PULMONARY HYPERTENSION (HCC): ICD-10-CM

## 2019-06-17 DIAGNOSIS — I34.0 NON-RHEUMATIC MITRAL REGURGITATION: Primary | ICD-10-CM

## 2019-06-17 DIAGNOSIS — I36.1 NON-RHEUMATIC TRICUSPID VALVE INSUFFICIENCY: ICD-10-CM

## 2019-06-17 DIAGNOSIS — R00.2 PALPITATIONS: ICD-10-CM

## 2019-06-17 PROCEDURE — 99214 OFFICE O/P EST MOD 30 MIN: CPT | Performed by: INTERNAL MEDICINE

## 2019-06-17 PROCEDURE — 93000 ELECTROCARDIOGRAM COMPLETE: CPT | Performed by: INTERNAL MEDICINE

## 2019-06-17 RX ORDER — BIOTIN 10 MG
TABLET ORAL
COMMUNITY
End: 2020-02-18

## 2019-06-17 RX ORDER — APIXABAN 2.5 MG/1
2.5 TABLET, FILM COATED ORAL 2 TIMES DAILY
Refills: 0 | COMMUNITY
Start: 2019-03-11 | End: 2020-03-24 | Stop reason: SDUPTHER

## 2019-06-17 RX ORDER — METOPROLOL SUCCINATE 25 MG/1
25 TABLET, EXTENDED RELEASE ORAL DAILY
Qty: 90 TABLET | Refills: 3 | Status: SHIPPED | OUTPATIENT
Start: 2019-06-17 | End: 2020-06-22

## 2019-06-17 NOTE — ASSESSMENT & PLAN NOTE
In the office today, she demonstrates atrial fibrillation.  EKG demonstrated a ventricular response of 90 bpm.  She is asymptomatic.  I am going to place her on metoprolol to help slow her rate.  She is on anticoagulation.  She will return to the office in 1 week for repeat EKG and I will plan on seeing her back in the office in 3 months

## 2019-06-17 NOTE — PROGRESS NOTES
Subjective:     Encounter Date:06/17/2019      Patient ID: Isabel Jacques is a 85 y.o. female.    Chief Complaint:  Chief Complaint   Patient presents with   • Atrial Fibrillation       HPI:  I had the pleasure of seeing Isabel in the office today.  She is a very juliano 85-year-old female with a history of paroxysmal atrial fibrillation.  She also has a history of atrial flutter and underwent atrial flutter ablation.  She is in the office today for routine care.  She is not complaining of chest discomfort or shortness of air.  She did have a hysterectomy in September 2018 states that she has gained some weight since that time.  She denies orthopnea or paroxysmal nocturnal dyspnea.  She does note occasional palpitations when she lays down at night.  Palpitations do not cause dizziness or near syncope.  She states that she has a prescription for Lasix at home but uses it infrequently.  Today in the office, on exam her rhythm is irregularly irregular and her rate is rapid initially.  Her EKG demonstrated atrial fibrillation with a ventricular rate of 90 bpm.    The following portions of the patient's history were reviewed and updated as appropriate: allergies, current medications, past family history, past medical history, past social history, past surgical history and problem list.    Problem List:  Patient Active Problem List   Diagnosis   • Uterovaginal prolapse   • Status post catheter ablation of atrial flutter   • Paroxysmal atrial fibrillation (CMS/HCC)   • Dyspnea on exertion   • Mitral regurgitation   • Tricuspid regurgitation   • Pulmonary hypertension (CMS/HCC)   • Palpitations       Past Medical History:  Past Medical History:   Diagnosis Date   • Anticoagulated     XARELTO. FOR A FIB. WILL CHECK WITH DR LIANG WHEN TO STOP   • Atrial fibrillation and flutter (CMS/HCC)     HAD FLUTTER ABLATION.    • CRF (chronic renal failure), stage 3 (moderate) (CMS/HCC)    • Female bladder prolapse    • High cholesterol     • History of cellulitis     LOWER EXTREMITIES   • History of UTI     RECENTLY HAD ONE. JUST COMPLETED MACRODANTIN LAST WEEK   • Macular degeneration    • Presence of pessary     REMOVED IN JUNE 2018   • Vaginal prolapse    • VHD (valvular heart disease)        Past Surgical History:  Past Surgical History:   Procedure Laterality Date   • ABLATION OF DYSRHYTHMIC FOCUS     • CARDIAC ABLATION      FOR A FLUTTER   • CATARACT EXTRACTION Bilateral    • CHOLECYSTECTOMY     • D&C HYSTEROSCOPY N/A 6/6/2017    Procedure: DILATATION AND CURETTAGE HYSTEROSCOPY;  Surgeon: Andres Lambert MD;  Location: [facility];  Service:    • VAGINAL HYSTERECTOMY N/A 9/27/2018    Procedure: TOTAL VAGINAL HYSTERECTOMY UTEROSACRAL LIGAMENT SUSPENSION;  Surgeon: Andres Lambert MD;  Location: [facility];  Service: Gynecology       Social History:  Social History     Socioeconomic History   • Marital status:      Spouse name: Not on file   • Number of children: Not on file   • Years of education: Not on file   • Highest education level: Not on file   Tobacco Use   • Smoking status: Never Smoker   • Smokeless tobacco: Never Used   Substance and Sexual Activity   • Alcohol use: No   • Drug use: No       Allergies:  Allergies   Allergen Reactions   • Stadol [Butorphanol] Shortness Of Breath           ROS:  Review of Systems   Constitution: Negative for chills, decreased appetite, fever, malaise/fatigue, weight gain and weight loss.   HENT: Negative for congestion, hoarse voice, nosebleeds and sore throat.    Eyes: Negative for blurred vision, double vision and visual disturbance.   Cardiovascular: Positive for irregular heartbeat and palpitations. Negative for chest pain, claudication, dyspnea on exertion, leg swelling, near-syncope, orthopnea, paroxysmal nocturnal dyspnea and syncope.   Respiratory: Negative for cough, hemoptysis, shortness of breath, sleep disturbances due to breathing, snoring, sputum production and wheezing.  "   Endocrine: Negative for cold intolerance, heat intolerance, polydipsia and polyuria.   Hematologic/Lymphatic: Negative for adenopathy and bleeding problem. Does not bruise/bleed easily.   Skin: Negative for flushing, itching, nail changes and rash.   Musculoskeletal: Positive for arthritis. Negative for back pain, joint pain, muscle cramps, muscle weakness, myalgias and neck pain.   Gastrointestinal: Negative for bloating, abdominal pain, anorexia, change in bowel habit, constipation, diarrhea, heartburn, hematemesis, hematochezia, jaundice, melena, nausea and vomiting.   Genitourinary: Negative for dysuria, hematuria and nocturia.   Neurological: Negative for brief paralysis, disturbances in coordination, excessive daytime sleepiness, dizziness, headaches, light-headedness, loss of balance, numbness, paresthesias, seizures and vertigo.   Psychiatric/Behavioral: Negative for altered mental status and depression. The patient is not nervous/anxious.    Allergic/Immunologic: Negative for environmental allergies and hives.          Objective:         /68   Pulse 120   Ht 160 cm (63\")   Wt 69.4 kg (153 lb)   BMI 27.10 kg/m²     Physical Exam   Constitutional: She is oriented to person, place, and time. She appears well-developed and well-nourished. No distress.   HENT:   Head: Normocephalic and atraumatic.   Mouth/Throat: Oropharynx is clear and moist.   Eyes: Conjunctivae and EOM are normal. Pupils are equal, round, and reactive to light. No scleral icterus.   Neck: Normal range of motion. Neck supple. No thyromegaly present.   Cardiovascular: Normal rate, S1 normal, S2 normal and intact distal pulses. An irregularly irregular rhythm present.  No extrasystoles are present. PMI is not displaced. Exam reveals no gallop, no S3, no S4, no friction rub and no decreased pulses.   Murmur ( There is a 1/6 to 2/6 systolic murmur heard at the left lower sternal border) heard.  Pulses:       Carotid pulses are 2+ on " the right side, and 2+ on the left side.       Dorsalis pedis pulses are 2+ on the right side, and 2+ on the left side.        Posterior tibial pulses are 2+ on the right side, and 2+ on the left side.   Pulmonary/Chest: Effort normal and breath sounds normal. No respiratory distress. She has no wheezes. She has no rales.   Abdominal: Soft. Bowel sounds are normal. She exhibits no distension and no mass. There is no tenderness. There is no rebound and no guarding.   Musculoskeletal: Normal range of motion. She exhibits edema ( Trace bilateral lower extremity edema).   Lymphadenopathy:     She has no cervical adenopathy.   Neurological: She is alert and oriented to person, place, and time. Coordination normal.   Skin: Skin is warm and dry. No rash noted. She is not diaphoretic. No pallor.   Psychiatric: She has a normal mood and affect. Her behavior is normal.   Nursing note and vitals reviewed.      In-Office Procedure(s):    ECG 12 Lead  Date/Time: 6/17/2019 10:48 AM  Performed by: Michaela Heaton MD  Authorized by: Michaela Heaton MD   Comparison: compared with previous ECG from 7/31/2018  Comparison to previous ECG: EKG in July 2018 demonstrates sinus rhythm with PACs and nonspecific ST changes  Comments: Atrial fibrillation with ventricular response of 90 bpm.  Nonspecific ST changes            ASCVD RIsk Score::  The ASCVD Risk score ([place] DC Jr., et al., 2013) failed to calculate for the following reasons:    The 2013 ASCVD risk score is only valid for ages 40 to 79    Recent Radiology:  Imaging Results (most recent)     None          Lab Review:   No visits with results within 2 Month(s) from this visit.   Latest known visit with results is:   Admission on 09/27/2018, Discharged on 09/29/2018   Component Date Value   • Case Report 09/27/2018                      Value:Surgical Pathology Report                         Case: [id number]                                  Authorizing Provider:  Andres Lambert  MD CHARLENE       Collected:           09/27/2018 08:24 AM          Ordering Location:     [facility]  Received:            09/27/2018 11:53 AM                                 MAIN OR                                                                      Pathologist:           Alin Winston MD                                                         Specimens:   1) - Uterus, UTERUS WITH CERVIX                                                                     2) - Ovaries, Bilateral with Fallopian Tubes                                              • Final Diagnosis 09/27/2018                      Value:This result contains rich text formatting which cannot be displayed here.   • Gross Description 09/27/2018                      Value:This result contains rich text formatting which cannot be displayed here.   • Microscopic Description 09/27/2018                      Value:This result contains rich text formatting which cannot be displayed here.   • Glucose 09/28/2018 110*   • BUN 09/28/2018 22    • Creatinine 09/28/2018 0.92    • Sodium 09/28/2018 138    • Potassium 09/28/2018 4.8    • Chloride 09/28/2018 107    • CO2 09/28/2018 20.8*   • Calcium 09/28/2018 7.7*   • eGFR Non  Amer 09/28/2018 58*   • BUN/Creatinine Ratio 09/28/2018 23.9    • Anion Gap 09/28/2018 10.2               Invalid input(s): ALKPO4                        Invalid input(s): LDLCALC                  Problems Addressed this Visit        Cardiovascular and Mediastinum    Paroxysmal atrial fibrillation (CMS/HCC)     In the office today, she demonstrates atrial fibrillation.  EKG demonstrated a ventricular response of 90 bpm.  She is asymptomatic.  I am going to place her on metoprolol to help slow her rate.  She is on anticoagulation.  She will return to the office in 1 week for repeat EKG and I will plan on seeing her back in the office in 3 months         Relevant Medications    metoprolol succinate XL (TOPROL-XL) 25 MG 24 hr  tablet    Mitral regurgitation - Primary     Stable.  She will need an echocardiogram in approximately 6 months         Relevant Medications    metoprolol succinate XL (TOPROL-XL) 25 MG 24 hr tablet    Tricuspid regurgitation     Stable         Relevant Medications    metoprolol succinate XL (TOPROL-XL) 25 MG 24 hr tablet    Pulmonary hypertension (CMS/HCC)    Palpitations     This most likely reflects intermittent atrial fibrillation.               Michaela Heaton MD  06/17/19  .

## 2019-06-24 ENCOUNTER — CLINICAL SUPPORT (OUTPATIENT)
Dept: CARDIOLOGY | Facility: CLINIC | Age: 84
End: 2019-06-24

## 2019-06-24 DIAGNOSIS — I48.0 PAROXYSMAL ATRIAL FIBRILLATION (HCC): Primary | ICD-10-CM

## 2019-06-24 PROCEDURE — 93000 ELECTROCARDIOGRAM COMPLETE: CPT | Performed by: INTERNAL MEDICINE

## 2019-06-27 NOTE — PROGRESS NOTES
Procedure     ECG 12 Lead  Date/Time: 6/24/2019 1:41 PM  Performed by: Michaela Heaton MD  Authorized by: Michaela Heaton MD   Comparison: compared with previous ECG from 6/17/2019  Similar to previous ECG  Comments: Atrial fibrillation with controlled ventricular response.  Nonspecific ST and T wave changes.           Pt coming in for a repeat EKG after starting Metoprolol. Dr. Heaton will interpret EKG. Curt performed EKG on 6-24-19

## 2019-09-16 ENCOUNTER — OFFICE VISIT (OUTPATIENT)
Dept: CARDIOLOGY | Facility: CLINIC | Age: 84
End: 2019-09-16

## 2019-09-16 VITALS
BODY MASS INDEX: 27.82 KG/M2 | HEART RATE: 68 BPM | DIASTOLIC BLOOD PRESSURE: 68 MMHG | SYSTOLIC BLOOD PRESSURE: 112 MMHG | WEIGHT: 157 LBS | HEIGHT: 63 IN

## 2019-09-16 DIAGNOSIS — I34.0 NON-RHEUMATIC MITRAL REGURGITATION: Primary | ICD-10-CM

## 2019-09-16 DIAGNOSIS — Z98.890 STATUS POST CATHETER ABLATION OF ATRIAL FLUTTER: ICD-10-CM

## 2019-09-16 DIAGNOSIS — I48.19 ATRIAL FIBRILLATION, PERSISTENT (HCC): ICD-10-CM

## 2019-09-16 PROBLEM — Z92.89 HISTORY OF NUCLEAR STRESS TEST: Status: ACTIVE | Noted: 2019-09-16

## 2019-09-16 PROCEDURE — 99213 OFFICE O/P EST LOW 20 MIN: CPT | Performed by: INTERNAL MEDICINE

## 2019-09-16 NOTE — PROGRESS NOTES
Subjective:     Encounter Date:09/16/2019      Patient ID: Isabel Jacques is a 85 y.o. female.    Chief Complaint:  Chief Complaint   Patient presents with   • Atrial Fibrillation   • Non rheumatic mitral regurgitation       HPI:  History of Present Illness  I had the pleasure in seeing Isabel in follow-up today.  She remains in atrial fibrillation.  At rest her ventricular response is normal.  Her heart rate does increase with exertion.  She states for the most part she feels fairly well except that she does not have any drive to do much.  She states that she is gained weight over the past year because she is not interested in doing many activities.  There have been several deaths in her family and she does appears to be somewhat depressed.  She states if she performs her routine activities, she does not have shortness of air.  If she overexerts herself then she will notice dyspnea.  She will also note a rapid heart rate.  She states if she sits down and is quiet for a few moments, her symptoms resolved.  She does not have symptoms of chest discomfort.  She has some mild lower extremity edema.  She denies orthopnea or paroxysmal nocturnal dyspnea.    The following portions of the patient's history were reviewed and updated as appropriate: allergies, current medications, past family history, past medical history, past social history, past surgical history and problem list.    Problem List:  Patient Active Problem List   Diagnosis   • Uterovaginal prolapse   • Status post catheter ablation of atrial flutter   • Dyspnea on exertion   • Mitral regurgitation   • Tricuspid regurgitation   • Pulmonary hypertension (CMS/HCC)   • Palpitations   • Atrial fibrillation, persistent (CMS/HCC)   • History of nuclear stress test       Past Medical History:  Past Medical History:   Diagnosis Date   • Anticoagulated     XARELTO. FOR A FIB. WILL CHECK WITH DR LIANG WHEN TO STOP   • Atrial fibrillation and flutter (CMS/HCC)     HAD  FLUTTER ABLATION.    • CRF (chronic renal failure), stage 3 (moderate) (CMS/HCC)    • Female bladder prolapse    • High cholesterol    • History of cellulitis     LOWER EXTREMITIES   • History of UTI     RECENTLY HAD ONE. JUST COMPLETED MACRODANTIN LAST WEEK   • Macular degeneration    • Presence of pessary     REMOVED IN JUNE 2018   • Vaginal prolapse    • VHD (valvular heart disease)        Past Surgical History:  Past Surgical History:   Procedure Laterality Date   • ABLATION OF DYSRHYTHMIC FOCUS     • CARDIAC ABLATION      FOR A FLUTTER   • CATARACT EXTRACTION Bilateral    • CHOLECYSTECTOMY     • D&C HYSTEROSCOPY N/A 6/6/2017    Procedure: DILATATION AND CURETTAGE HYSTEROSCOPY;  Surgeon: Andres Lambert MD;  Location: [facility];  Service:    • VAGINAL HYSTERECTOMY N/A 9/27/2018    Procedure: TOTAL VAGINAL HYSTERECTOMY UTEROSACRAL LIGAMENT SUSPENSION;  Surgeon: Andres Lambert MD;  Location: [facility];  Service: Gynecology       Social History:  Social History     Socioeconomic History   • Marital status:      Spouse name: Not on file   • Number of children: Not on file   • Years of education: Not on file   • Highest education level: Not on file   Tobacco Use   • Smoking status: Never Smoker   • Smokeless tobacco: Never Used   Substance and Sexual Activity   • Alcohol use: No   • Drug use: No       Allergies:  Allergies   Allergen Reactions   • Stadol [Butorphanol] Shortness Of Breath       Immunizations:  Immunization History   Administered Date(s) Administered   • Tdap 11/21/2016       ROS:  Review of Systems   Constitution: Negative for chills, decreased appetite, fever, malaise/fatigue, weight gain and weight loss.   HENT: Negative for congestion, hoarse voice, nosebleeds and sore throat.    Eyes: Negative for blurred vision, double vision and visual disturbance.   Cardiovascular: Positive for dyspnea on exertion and leg swelling. Negative for chest pain, claudication, near-syncope,  "orthopnea, paroxysmal nocturnal dyspnea and syncope.   Respiratory: Negative for cough, hemoptysis, shortness of breath, sleep disturbances due to breathing, snoring, sputum production and wheezing.    Endocrine: Negative for cold intolerance, heat intolerance, polydipsia and polyuria.   Hematologic/Lymphatic: Negative for adenopathy and bleeding problem. Does not bruise/bleed easily.   Skin: Negative for flushing, itching, nail changes and rash.   Musculoskeletal: Positive for arthritis. Negative for back pain, joint pain, muscle cramps, muscle weakness, myalgias and neck pain.   Gastrointestinal: Negative for bloating, abdominal pain, anorexia, change in bowel habit, constipation, diarrhea, heartburn, hematemesis, hematochezia, jaundice, melena, nausea and vomiting.   Genitourinary: Negative for dysuria, hematuria and nocturia.   Neurological: Negative for brief paralysis, disturbances in coordination, excessive daytime sleepiness, dizziness, headaches, light-headedness, loss of balance, numbness, paresthesias, seizures and vertigo.   Psychiatric/Behavioral: Negative for altered mental status and depression. The patient is not nervous/anxious.    Allergic/Immunologic: Negative for environmental allergies and hives.          Objective:         /68   Pulse 68   Ht 160 cm (63\")   Wt 71.2 kg (157 lb)   BMI 27.81 kg/m²     Physical Exam   Constitutional: She is oriented to person, place, and time. She appears well-developed and well-nourished. No distress.   HENT:   Head: Normocephalic and atraumatic.   Mouth/Throat: Oropharynx is clear and moist.   Eyes: Conjunctivae and EOM are normal. Pupils are equal, round, and reactive to light. No scleral icterus.   Neck: Normal range of motion. Neck supple. No thyromegaly present.   Cardiovascular: Normal rate, S1 normal, S2 normal and intact distal pulses. An irregularly irregular rhythm present.  No extrasystoles are present. PMI is not displaced. Exam reveals no " gallop, no S3, no S4, no friction rub and no decreased pulses.   Murmur ( There is a 1/6 to 2/6 systolic murmur heard at the left lower sternal border) heard.  Pulses:       Carotid pulses are 2+ on the right side, and 2+ on the left side.       Dorsalis pedis pulses are 2+ on the right side, and 2+ on the left side.        Posterior tibial pulses are 2+ on the right side, and 2+ on the left side.   Pulmonary/Chest: Effort normal and breath sounds normal. No respiratory distress. She has no wheezes. She has no rales.   Abdominal: Soft. Bowel sounds are normal. She exhibits no distension and no mass. There is no tenderness. There is no rebound and no guarding.   Musculoskeletal: Normal range of motion. She exhibits edema ( Trace bilateral lower extremity edema).   Lymphadenopathy:     She has no cervical adenopathy.   Neurological: She is alert and oriented to person, place, and time. Coordination normal.   Skin: Skin is warm and dry. No rash noted. She is not diaphoretic. No pallor.   Psychiatric: Her behavior is normal. She exhibits a depressed mood.   Nursing note and vitals reviewed.      In-Office Procedure(s):  Procedures    ASCVD RIsk Score::  The ASCVD Risk score ([place] Jr., et al., 2013) failed to calculate for the following reasons:    The 2013 ASCVD risk score is only valid for ages 40 to 79    Recent Radiology:  Imaging Results (most recent)     None          Lab Review:   not applicable             Assessment:          Diagnosis Plan   1. Non-rheumatic mitral regurgitation     2. Status post catheter ablation of atrial flutter     3. Atrial fibrillation, persistent (CMS/HCC)            Plan:   Isabel does not have any specific complaints.  She does have dyspnea with exertion and her heart rate does tend to be elevated with exertion.  I am going to see her back in 3 months.  If she continues with atrial fibrillation and vague complaints, will consider repeat cardioversion or have her see Dr. Zack Tadeo to  discuss ablation.  I have told her that if her heart rate becomes accelerated, she may increase her metoprolol.  I did not want to do this on a routine basis since she does have a history of becoming somewhat bradycardic with higher doses.        Level of Care:                 Michaela Heaton MD  09/16/19  .

## 2020-02-18 ENCOUNTER — OFFICE VISIT (OUTPATIENT)
Dept: CARDIOLOGY | Facility: CLINIC | Age: 85
End: 2020-02-18

## 2020-02-18 VITALS
HEART RATE: 78 BPM | HEIGHT: 63 IN | WEIGHT: 161 LBS | BODY MASS INDEX: 28.53 KG/M2 | SYSTOLIC BLOOD PRESSURE: 134 MMHG | DIASTOLIC BLOOD PRESSURE: 88 MMHG

## 2020-02-18 DIAGNOSIS — R06.09 DYSPNEA ON EXERTION: ICD-10-CM

## 2020-02-18 DIAGNOSIS — I34.0 NONRHEUMATIC MITRAL VALVE REGURGITATION: ICD-10-CM

## 2020-02-18 DIAGNOSIS — I48.19 ATRIAL FIBRILLATION, PERSISTENT (HCC): Primary | ICD-10-CM

## 2020-02-18 PROCEDURE — 99214 OFFICE O/P EST MOD 30 MIN: CPT | Performed by: INTERNAL MEDICINE

## 2020-02-18 RX ORDER — FUROSEMIDE 20 MG/1
20 TABLET ORAL DAILY
Qty: 90 TABLET | Refills: 3 | Status: SHIPPED | OUTPATIENT
Start: 2020-02-18 | End: 2020-12-21

## 2020-02-18 RX ORDER — CHOLECALCIFEROL (VITAMIN D3) 10(400)/ML
5 DROPS ORAL AS NEEDED
COMMUNITY

## 2020-02-18 NOTE — PROGRESS NOTES
Subjective:     Encounter Date:02/18/2020      Patient ID: Isabel Jacques is a 85 y.o. female.    Chief Complaint:  Chief Complaint   Patient presents with   • Atrial Fibrillation       HPI:  History of Present Illness  I had the pleasure of seeing Ms. Jacques in the office today.  She is a pleasant 85-year-old female with a history of atrial fibrillation, originally diagnosed in 2017.  She was cardioverted to sinus rhythm.  She then developed atrial flutter and underwent atrial flutter ablation in 2017.  She has since returned to atrial fibrillation.  She has documented atrial fibrillation since June 2019.  She does not feel well.  She states that she does not feel poorly but her energy level is low.  If she begins to exert herself, she feels fatigued and short of air.  She is not complaining of orthopnea or paroxysmal nocturnal dyspnea.  She states that she has gained weight and attributes this to her low energy level.  Her previous echocardiogram did demonstrate moderate mitral insufficiency in 2018.  Ms. Jacques also has dyslipidemia but prefers not to take statin therapy.  Her blood pressure is elevated in the office today but she states that normally it is within normal limits.  Prior ischemic evaluation in January 2017 revealed a small distal septal wall reversible defect.  Low risk study.  Ejection fraction and wall motion were normal.  Ms. Jacques does not complain of chest discomfort.  In the past she has had episodes of bradycardia therefore, I have been reluctant to increase her metoprolol.  Her baseline heart rate was initially 78 bpm but when I listened to her on exam her rate was significantly elevated.  I did walk her down the mancera and her heart rate was elevated at approximately 120 bpm.      The following portions of the patient's history were reviewed and updated as appropriate: allergies, current medications, past family history, past medical history, past social history, past surgical history and  problem list.    Problem List:  Patient Active Problem List   Diagnosis   • Uterovaginal prolapse   • Status post catheter ablation of atrial flutter   • Dyspnea on exertion   • Mitral regurgitation   • Tricuspid regurgitation   • Pulmonary hypertension (CMS/HCC)   • Palpitations   • Atrial fibrillation, persistent   • History of nuclear stress test       Past Medical History:  Past Medical History:   Diagnosis Date   • Anticoagulated     XARELTO. FOR A FIB. WILL CHECK WITH DR LIANG WHEN TO STOP   • Atrial fibrillation and flutter (CMS/HCC)     HAD FLUTTER ABLATION.    • CRF (chronic renal failure), stage 3 (moderate) (CMS/HCC)    • Female bladder prolapse    • High cholesterol    • History of cellulitis     LOWER EXTREMITIES   • History of UTI     RECENTLY HAD ONE. JUST COMPLETED MACRODANTIN LAST WEEK   • Macular degeneration    • Presence of pessary     REMOVED IN JUNE 2018   • Vaginal prolapse    • VHD (valvular heart disease)        Past Surgical History:  Past Surgical History:   Procedure Laterality Date   • ABLATION OF DYSRHYTHMIC FOCUS     • CARDIAC ABLATION      FOR A FLUTTER   • CATARACT EXTRACTION Bilateral    • CHOLECYSTECTOMY     • D&C HYSTEROSCOPY N/A 6/6/2017    Procedure: DILATATION AND CURETTAGE HYSTEROSCOPY;  Surgeon: Andres Lambert MD;  Location: [facility];  Service:    • VAGINAL HYSTERECTOMY N/A 9/27/2018    Procedure: TOTAL VAGINAL HYSTERECTOMY UTEROSACRAL LIGAMENT SUSPENSION;  Surgeon: Andres Lambert MD;  Location: [facility];  Service: Gynecology       Social History:  Social History     Socioeconomic History   • Marital status:      Spouse name: Not on file   • Number of children: Not on file   • Years of education: Not on file   • Highest education level: Not on file   Tobacco Use   • Smoking status: Never Smoker   • Smokeless tobacco: Never Used   Substance and Sexual Activity   • Alcohol use: No   • Drug use: No       Allergies:  Allergies   Allergen Reactions   •  "Stadol [Butorphanol] Shortness Of Breath       Immunizations:  Immunization History   Administered Date(s) Administered   • Tdap 11/21/2016       ROS:  Review of Systems   Constitution: Positive for malaise/fatigue and weight gain. Negative for chills, decreased appetite, fever and weight loss.   HENT: Negative for congestion, hoarse voice, nosebleeds and sore throat.    Eyes: Negative for blurred vision, double vision and visual disturbance.   Cardiovascular: Positive for dyspnea on exertion, irregular heartbeat and leg swelling. Negative for chest pain, claudication, near-syncope, orthopnea, palpitations, paroxysmal nocturnal dyspnea and syncope.   Respiratory: Negative for cough, hemoptysis, shortness of breath, sleep disturbances due to breathing, snoring, sputum production and wheezing.    Endocrine: Negative for cold intolerance, heat intolerance, polydipsia and polyuria.   Hematologic/Lymphatic: Negative for adenopathy and bleeding problem. Does not bruise/bleed easily.   Skin: Negative for flushing, itching, nail changes and rash.   Musculoskeletal: Negative for arthritis, back pain, joint pain, muscle cramps, muscle weakness, myalgias and neck pain.   Gastrointestinal: Negative for bloating, abdominal pain, anorexia, change in bowel habit, constipation, diarrhea, heartburn, hematemesis, hematochezia, jaundice, melena, nausea and vomiting.   Genitourinary: Negative for dysuria, hematuria and nocturia.   Neurological: Negative for brief paralysis, disturbances in coordination, excessive daytime sleepiness, dizziness, headaches, light-headedness, loss of balance, numbness, paresthesias, seizures and vertigo.   Psychiatric/Behavioral: Negative for altered mental status and depression. The patient is not nervous/anxious.    Allergic/Immunologic: Negative for environmental allergies and hives.          Objective:         /88   Pulse 78   Ht 160 cm (63\")   Wt 73 kg (161 lb)   BMI 28.52 kg/m²     Physical " Exam   Constitutional: She is oriented to person, place, and time. She appears well-developed and well-nourished. No distress.   HENT:   Head: Normocephalic and atraumatic.   Mouth/Throat: Oropharynx is clear and moist.   Eyes: Pupils are equal, round, and reactive to light. Conjunctivae and EOM are normal. No scleral icterus.   Neck: Normal range of motion. Neck supple. No thyromegaly present.   Cardiovascular: S1 normal, S2 normal and intact distal pulses. An irregularly irregular rhythm present.  No extrasystoles are present. Tachycardia present. PMI is not displaced. Exam reveals no gallop, no S3, no S4, no friction rub and no decreased pulses.   Murmur ( There is a 1/6 to 2/6 systolic murmur heard at the left lower sternal border) heard.  Pulses:       Carotid pulses are 2+ on the right side, and 2+ on the left side.       Radial pulses are 2+ on the right side, and 2+ on the left side.        Dorsalis pedis pulses are 2+ on the right side, and 2+ on the left side.        Posterior tibial pulses are 2+ on the right side, and 2+ on the left side.   Pulmonary/Chest: Effort normal and breath sounds normal. No respiratory distress. She has no wheezes. She has no rales.   Abdominal: Soft. Bowel sounds are normal. She exhibits no distension and no mass. There is no tenderness. There is no rebound and no guarding.   Musculoskeletal: Normal range of motion. She exhibits edema (There is 1+ bilateral lower extremity edema.).   Lymphadenopathy:     She has no cervical adenopathy.   Neurological: She is alert and oriented to person, place, and time. Coordination normal.   Skin: Skin is warm and dry. No rash noted. She is not diaphoretic. No pallor.   Psychiatric: Her behavior is normal.   Nursing note and vitals reviewed.      In-Office Procedure(s):  Procedures    ASCVD RIsk Score::  The ASCVD Risk score (Deshawnlety BROWNING Jr., et al., 2013) failed to calculate for the following reasons:    The 2013 ASCVD risk score is only valid  for ages 40 to 79    Recent Radiology:  Imaging Results (Most Recent)     None          Lab Review:   not applicable             Assessment:          Diagnosis Plan   1. Atrial fibrillation, persistent  Holter Monitor - 24 Hour    Adult Transthoracic Echo Complete W/ Cont if Necessary Per Protocol   2. Dyspnea on exertion  furosemide (LASIX) 20 MG tablet    Adult Transthoracic Echo Complete W/ Cont if Necessary Per Protocol   3. Nonrheumatic mitral valve regurgitation            Plan:      I am going to place a monitor to get an average heart rate.  I do not want to increase her metoprolol secondary to prior history of bradycardia.  She is on Eliquis and she is compliant with medication.  I am going to check a repeat echocardiogram to assess her mitral insufficiency.  I have told her that I want her to take Lasix on a daily basis.  She is currently taking it on a as needed basis.  She does have lower extremity edema and clinically has volume excess.  I will obtain her most recent lab work from       Level of Care:                 Michaela Heaton MD  02/18/20  .

## 2020-02-27 ENCOUNTER — HOSPITAL ENCOUNTER (OUTPATIENT)
Dept: CARDIOLOGY | Facility: HOSPITAL | Age: 85
Discharge: HOME | End: 2020-02-27
Admitting: INTERNAL MEDICINE

## 2020-02-27 DIAGNOSIS — I48.19 ATRIAL FIBRILLATION, PERSISTENT (HCC): ICD-10-CM

## 2020-02-27 DIAGNOSIS — R06.09 DYSPNEA ON EXERTION: ICD-10-CM

## 2020-02-27 PROCEDURE — 93306 TTE W/DOPPLER COMPLETE: CPT

## 2020-02-27 PROCEDURE — 93306 TTE W/DOPPLER COMPLETE: CPT | Performed by: INTERNAL MEDICINE

## 2020-03-01 LAB
BH CV ECHO MEAS - ACS: 1.6 CM
BH CV ECHO MEAS - AO MAX PG (FULL): 1.9 MMHG
BH CV ECHO MEAS - AO MAX PG: 3.8 MMHG
BH CV ECHO MEAS - AO MEAN PG (FULL): 1 MMHG
BH CV ECHO MEAS - AO MEAN PG: 2 MMHG
BH CV ECHO MEAS - AO ROOT AREA (BSA CORRECTED): 1.6
BH CV ECHO MEAS - AO ROOT AREA: 6.6 CM^2
BH CV ECHO MEAS - AO ROOT DIAM: 2.9 CM
BH CV ECHO MEAS - AO V2 MAX: 98.1 CM/SEC
BH CV ECHO MEAS - AO V2 MEAN: 56.3 CM/SEC
BH CV ECHO MEAS - AO V2 VTI: 18.4 CM
BH CV ECHO MEAS - AVA(I,A): 1.7 CM^2
BH CV ECHO MEAS - AVA(I,D): 1.7 CM^2
BH CV ECHO MEAS - AVA(V,A): 1.8 CM^2
BH CV ECHO MEAS - AVA(V,D): 1.8 CM^2
BH CV ECHO MEAS - BSA(HAYCOCK): 1.8 M^2
BH CV ECHO MEAS - BSA: 1.8 M^2
BH CV ECHO MEAS - BZI_BMI: 28.5 KILOGRAMS/M^2
BH CV ECHO MEAS - BZI_METRIC_HEIGHT: 160 CM
BH CV ECHO MEAS - BZI_METRIC_WEIGHT: 73 KG
BH CV ECHO MEAS - EDV(CUBED): 54.9 ML
BH CV ECHO MEAS - EDV(MOD-SP2): 42 ML
BH CV ECHO MEAS - EDV(MOD-SP4): 37 ML
BH CV ECHO MEAS - EDV(TEICH): 62 ML
BH CV ECHO MEAS - EF(CUBED): 60 %
BH CV ECHO MEAS - EF(MOD-BP): 51 %
BH CV ECHO MEAS - EF(MOD-SP2): 47.6 %
BH CV ECHO MEAS - EF(MOD-SP4): 51.4 %
BH CV ECHO MEAS - EF(TEICH): 52.3 %
BH CV ECHO MEAS - ESV(CUBED): 22 ML
BH CV ECHO MEAS - ESV(MOD-SP2): 22 ML
BH CV ECHO MEAS - ESV(MOD-SP4): 18 ML
BH CV ECHO MEAS - ESV(TEICH): 29.6 ML
BH CV ECHO MEAS - FS: 26.3 %
BH CV ECHO MEAS - IVS/LVPW: 0.82
BH CV ECHO MEAS - IVSD: 0.9 CM
BH CV ECHO MEAS - LV DIASTOLIC VOL/BSA (35-75): 21 ML/M^2
BH CV ECHO MEAS - LV MASS(C)D: 117.3 GRAMS
BH CV ECHO MEAS - LV MASS(C)DI: 66.5 GRAMS/M^2
BH CV ECHO MEAS - LV MAX PG: 1.9 MMHG
BH CV ECHO MEAS - LV MEAN PG: 1 MMHG
BH CV ECHO MEAS - LV SYSTOLIC VOL/BSA (12-30): 10.2 ML/M^2
BH CV ECHO MEAS - LV V1 MAX: 69.5 CM/SEC
BH CV ECHO MEAS - LV V1 MEAN: 43 CM/SEC
BH CV ECHO MEAS - LV V1 VTI: 12.6 CM
BH CV ECHO MEAS - LVIDD: 3.8 CM
BH CV ECHO MEAS - LVIDS: 2.8 CM
BH CV ECHO MEAS - LVLD AP2: 5.2 CM
BH CV ECHO MEAS - LVLD AP4: 5.7 CM
BH CV ECHO MEAS - LVLS AP2: 4.8 CM
BH CV ECHO MEAS - LVLS AP4: 5.1 CM
BH CV ECHO MEAS - LVOT AREA (M): 2.5 CM^2
BH CV ECHO MEAS - LVOT AREA: 2.5 CM^2
BH CV ECHO MEAS - LVOT DIAM: 1.8 CM
BH CV ECHO MEAS - LVPWD: 1.1 CM
BH CV ECHO MEAS - MV DEC SLOPE: 662 CM/SEC^2
BH CV ECHO MEAS - MV DEC TIME: 155 SEC
BH CV ECHO MEAS - MV E MAX VEL: 75.2 CM/SEC
BH CV ECHO MEAS - MV MAX PG: 2.5 MMHG
BH CV ECHO MEAS - MV MEAN PG: 1 MMHG
BH CV ECHO MEAS - MV P1/2T MAX VEL: 80.8 CM/SEC
BH CV ECHO MEAS - MV P1/2T: 35.7 MSEC
BH CV ECHO MEAS - MV V2 MAX: 79.6 CM/SEC
BH CV ECHO MEAS - MV V2 MEAN: 41 CM/SEC
BH CV ECHO MEAS - MV V2 VTI: 12.4 CM
BH CV ECHO MEAS - MVA P1/2T LCG: 2.7 CM^2
BH CV ECHO MEAS - MVA(P1/2T): 6.2 CM^2
BH CV ECHO MEAS - MVA(VTI): 2.6 CM^2
BH CV ECHO MEAS - PA ACC TIME: 0.09 SEC
BH CV ECHO MEAS - PA MAX PG (FULL): 0.07 MMHG
BH CV ECHO MEAS - PA MAX PG: 1.3 MMHG
BH CV ECHO MEAS - PA PR(ACCEL): 39.9 MMHG
BH CV ECHO MEAS - PA V2 MAX: 57 CM/SEC
BH CV ECHO MEAS - RAP SYSTOLE: 8 MMHG
BH CV ECHO MEAS - RV MAX PG: 1.2 MMHG
BH CV ECHO MEAS - RV MEAN PG: 1 MMHG
BH CV ECHO MEAS - RV V1 MAX: 55.5 CM/SEC
BH CV ECHO MEAS - RV V1 MEAN: 34.3 CM/SEC
BH CV ECHO MEAS - RV V1 VTI: 8.1 CM
BH CV ECHO MEAS - RVSP: 30.7 MMHG
BH CV ECHO MEAS - SI(AO): 68.9 ML/M^2
BH CV ECHO MEAS - SI(CUBED): 18.7 ML/M^2
BH CV ECHO MEAS - SI(LVOT): 18.2 ML/M^2
BH CV ECHO MEAS - SI(MOD-SP2): 11.3 ML/M^2
BH CV ECHO MEAS - SI(MOD-SP4): 10.8 ML/M^2
BH CV ECHO MEAS - SI(TEICH): 18.4 ML/M^2
BH CV ECHO MEAS - SUP REN AO DIAM: 2.2 CM
BH CV ECHO MEAS - SV(AO): 121.5 ML
BH CV ECHO MEAS - SV(CUBED): 32.9 ML
BH CV ECHO MEAS - SV(LVOT): 32.1 ML
BH CV ECHO MEAS - SV(MOD-SP2): 20 ML
BH CV ECHO MEAS - SV(MOD-SP4): 19 ML
BH CV ECHO MEAS - SV(TEICH): 32.4 ML
BH CV ECHO MEAS - TAPSE (>1.6): 1.9 CM
BH CV ECHO MEAS - TR MAX VEL: 238 CM/SEC
BH CV XLRA - RV BASE: 2.8 CM
BH CV XLRA - RV LENGTH: 6.1 CM
BH CV XLRA - RV MID: 2.9 CM
LEFT ATRIUM VOLUME INDEX: 48 ML/M2
MAXIMAL PREDICTED HEART RATE: 135 BPM
STRESS TARGET HR: 115 BPM

## 2020-03-24 ENCOUNTER — TELEPHONE (OUTPATIENT)
Dept: CARDIOLOGY | Facility: CLINIC | Age: 85
End: 2020-03-24

## 2020-03-24 RX ORDER — APIXABAN 2.5 MG/1
2.5 TABLET, FILM COATED ORAL 2 TIMES DAILY
Qty: 60 TABLET | Refills: 5 | Status: SHIPPED | OUTPATIENT
Start: 2020-03-24 | End: 2020-09-21

## 2020-03-24 NOTE — TELEPHONE ENCOUNTER
Rx sent in. Spoke with the patient and the Furosemide has helped her edema, she does not drink much water during the day, I have encouraged her to do this. dmk

## 2020-03-24 NOTE — TELEPHONE ENCOUNTER
PT needs refill on Eliquis 2.5mg taken BID sent to [facility] pharmacy if she still needs to take the medicine.    Also she takes both Furosemide and potassium together and she does not have an extra need to urinate. Is this normal? She wants to know if it is taking fluid off of her if she is  Not urinating more than usual?

## 2020-06-21 DIAGNOSIS — I48.0 PAROXYSMAL ATRIAL FIBRILLATION (HCC): ICD-10-CM

## 2020-06-22 RX ORDER — METOPROLOL SUCCINATE 25 MG/1
TABLET, EXTENDED RELEASE ORAL
Qty: 90 TABLET | Refills: 0 | Status: SHIPPED | OUTPATIENT
Start: 2020-06-22 | End: 2020-09-18

## 2020-09-04 RX ORDER — POTASSIUM CHLORIDE 750 MG/1
10 TABLET, FILM COATED, EXTENDED RELEASE ORAL AS NEEDED
Qty: 30 TABLET | Refills: 5 | Status: SHIPPED | OUTPATIENT
Start: 2020-09-04 | End: 2021-11-08

## 2020-09-18 DIAGNOSIS — I48.0 PAROXYSMAL ATRIAL FIBRILLATION (HCC): ICD-10-CM

## 2020-09-18 RX ORDER — METOPROLOL SUCCINATE 25 MG/1
TABLET, EXTENDED RELEASE ORAL
Qty: 90 TABLET | Refills: 0 | Status: SHIPPED | OUTPATIENT
Start: 2020-09-18 | End: 2020-12-29

## 2020-09-21 RX ORDER — APIXABAN 2.5 MG/1
2.5 TABLET, FILM COATED ORAL 2 TIMES DAILY
Qty: 60 TABLET | Refills: 0 | Status: SHIPPED | OUTPATIENT
Start: 2020-09-21 | End: 2021-01-11

## 2020-12-20 DIAGNOSIS — R06.09 DYSPNEA ON EXERTION: ICD-10-CM

## 2020-12-22 RX ORDER — FUROSEMIDE 20 MG/1
TABLET ORAL
Qty: 90 TABLET | Refills: 1 | Status: SHIPPED | OUTPATIENT
Start: 2020-12-22 | End: 2022-02-14

## 2020-12-29 DIAGNOSIS — I48.0 PAROXYSMAL ATRIAL FIBRILLATION (HCC): ICD-10-CM

## 2020-12-29 RX ORDER — METOPROLOL SUCCINATE 25 MG/1
TABLET, EXTENDED RELEASE ORAL
Qty: 90 TABLET | Refills: 0 | Status: SHIPPED | OUTPATIENT
Start: 2020-12-29 | End: 2021-02-01 | Stop reason: SDUPTHER

## 2021-01-03 PROBLEM — I48.19 ATRIAL FIBRILLATION, PERSISTENT (HCC): Chronic | Status: ACTIVE | Noted: 2019-09-16

## 2021-01-03 PROBLEM — R00.2 PALPITATIONS: Chronic | Status: ACTIVE | Noted: 2019-06-17

## 2021-01-03 PROBLEM — I38 VALVULAR HEART DISEASE: Chronic | Status: ACTIVE | Noted: 2021-01-03

## 2021-01-03 PROBLEM — I38 VALVULAR HEART DISEASE: Status: ACTIVE | Noted: 2021-01-03

## 2021-01-04 ENCOUNTER — TELEMEDICINE (OUTPATIENT)
Dept: CARDIOLOGY | Facility: CLINIC | Age: 86
End: 2021-01-04

## 2021-01-04 VITALS
SYSTOLIC BLOOD PRESSURE: 129 MMHG | HEART RATE: 100 BPM | WEIGHT: 164 LBS | HEIGHT: 63 IN | DIASTOLIC BLOOD PRESSURE: 93 MMHG | BODY MASS INDEX: 29.06 KG/M2

## 2021-01-04 DIAGNOSIS — I38 VALVULAR HEART DISEASE: Chronic | ICD-10-CM

## 2021-01-04 DIAGNOSIS — Z98.890 STATUS POST CATHETER ABLATION OF ATRIAL FLUTTER: ICD-10-CM

## 2021-01-04 DIAGNOSIS — R00.2 PALPITATIONS: Chronic | ICD-10-CM

## 2021-01-04 DIAGNOSIS — I48.19 ATRIAL FIBRILLATION, PERSISTENT (HCC): Primary | Chronic | ICD-10-CM

## 2021-01-04 PROCEDURE — 99443 PR PHYS/QHP TELEPHONE EVALUATION 21-30 MIN: CPT | Performed by: INTERNAL MEDICINE

## 2021-01-04 NOTE — PROGRESS NOTES
Subjective:     Encounter Date:01/04/2021      Patient ID: Isabel Jacques is a 86 y.o. female.    Chief Complaint:  Chief Complaint   Patient presents with   • Follow-up       HPI:  History of Present Illness     Unable to complete visit using a video connection to the patient. A phone visit was used to complete this visits. Total time of discussion was  Minutes.    I attempted a video visit with Ms. Jacques today.  This was unsuccessful therefore it was transferred to a telephone visit.   She is a pleasant 86-year-old female with a history of atrial fibrillation, originally diagnosed in 2017.  She was cardioverted to sinus rhythm.  She then developed atrial flutter and underwent atrial flutter ablation in 2017.  She had an echocardiogram in February 2020 which demonstrated an ejection fraction of 50% with a severely dilated left atrium.  She had mild aortic insufficiency, mild to moderate mitral insufficiency and moderate tricuspid insufficiency.  She had an ischemic evaluation in January 2017 which revealed a small distal septal wall reversible defect.  Was felt to be a low risk study.  In the past she has had episodes of bradycardia.  A Holter monitor in February 2020 revealed atrial fibrillation throughout the monitor with rates varying from 53 to 166 bpm.  I had scheduled her an appointment to see Dr. Tadeo however the appointment was canceled secondary to the current pandemic.  Today Ms. Jacques states that her energy level is poor.  She does have shortness of air at rest and dyspnea on exertion.  She does not describe orthopnea or paroxysmal nocturnal dyspnea.  Her heart rate is approximately 120 bpm and her systolic blood pressure is generally in the mid 90s.  She is on metoprolol at a low dose but have been unable to increase further secondary to her blood pressure.  She does have some lower extremity edema.  She is on Eliquis currently at a dose of 2.5 mg twice daily.  This may need to be increased.  I  "reviewed her lab from January 2020 and her potassium was 4.3 with a BUN and creatinine of 16 and 0.84.  She states however she has gained weight since initiation of the medication.  She states that she was on a medication in the past to help control her atrial fibrillation and had significant side effects to it.  She thinks it was amiodarone.  Allergies:  Allergies   Allergen Reactions   • Stadol [Butorphanol] Shortness Of Breath           ROS:  Review of Systems   Constitution: Positive for malaise/fatigue and weight gain.   Cardiovascular: Positive for dyspnea on exertion, irregular heartbeat, leg swelling and palpitations.   Respiratory: Positive for shortness of breath.           Objective:         /93   Pulse 100   Ht 160 cm (63\")   Wt 74.4 kg (164 lb)   BMI 29.05 kg/m²     Physical Exam  Unable to perform physical exam secondary to telephone visit   In-Office Procedure(s):  Procedures    ASCVD RIsk Score::  The ASCVD Risk score (Deshawn BROWNING Jr., et al., 2013) failed to calculate for the following reasons:    The 2013 ASCVD risk score is only valid for ages 40 to 79    Recent Radiology:  Imaging Results (Most Recent)     None          Lab Review:   not applicable               Assessment:          Diagnosis Plan   1. Atrial fibrillation, persistent (CMS/HCC)     2. Palpitations     3. Valvular heart disease     4. Status post catheter ablation of atrial flutter            Plan:   1.  Persistent atrial fibrillation  Ms. Jacques has been in atrial fibrillation since June 2019 (at least documented atrial fibrillation at that time).  She is on anticoagulation with Eliquis at 2.5 mg twice daily.  Her rate does not appear to be adequately controlled.  I do believe her elevated rate and loss of atrial kick are the etiology of her symptoms of lethargy and dyspnea.  I am going to get her an appointment to see Dr. Zack Tadeo.  This is scheduled for 1/18/2021.  We will see if she is a candidate for atrial fibrillation " ablation or may need AV node ablation.  I am concerned that if she remains at a rapid rate in atrial fibrillation, she could develop a tachycardia mediated cardiomyopathy.    2.  Valvular heart disease  Her echocardiogram in February 2020 demonstrated mild aortic insufficiency, mild to moderate mitral insufficiency and moderate tricuspid insufficiency.    Length of telephone visit was 21 minutes  Review of records and EMR documentation, review of lab was 15 minutes     Level of Care:                 Michaela Heaton MD  01/04/21  .

## 2021-01-09 DIAGNOSIS — I48.19 ATRIAL FIBRILLATION, PERSISTENT (HCC): Primary | Chronic | ICD-10-CM

## 2021-01-11 RX ORDER — APIXABAN 2.5 MG/1
2.5 TABLET, FILM COATED ORAL 2 TIMES DAILY
Qty: 180 TABLET | Refills: 1 | Status: SHIPPED | OUTPATIENT
Start: 2021-01-11 | End: 2021-04-08 | Stop reason: DRUGHIGH

## 2021-01-18 ENCOUNTER — OFFICE VISIT (OUTPATIENT)
Dept: CARDIOLOGY | Facility: CLINIC | Age: 86
End: 2021-01-18

## 2021-01-18 VITALS
SYSTOLIC BLOOD PRESSURE: 132 MMHG | HEART RATE: 102 BPM | BODY MASS INDEX: 29.06 KG/M2 | DIASTOLIC BLOOD PRESSURE: 84 MMHG | OXYGEN SATURATION: 99 % | WEIGHT: 164 LBS | HEIGHT: 63 IN | RESPIRATION RATE: 18 BRPM

## 2021-01-18 DIAGNOSIS — I38 VALVULAR HEART DISEASE: Chronic | ICD-10-CM

## 2021-01-18 DIAGNOSIS — Z98.890 STATUS POST CATHETER ABLATION OF ATRIAL FLUTTER: ICD-10-CM

## 2021-01-18 DIAGNOSIS — I48.19 ATRIAL FIBRILLATION, PERSISTENT (HCC): Primary | Chronic | ICD-10-CM

## 2021-01-18 PROCEDURE — 99213 OFFICE O/P EST LOW 20 MIN: CPT | Performed by: INTERNAL MEDICINE

## 2021-01-18 NOTE — PROGRESS NOTES
Subjective:     Encounter Date:01/18/2021      Patient ID: Isabel Jacques is a 86 y.o. female.    Chief Complaint:  Atrial fibrillation    HPI:  Ms Jacques is an 85 yo patient of Dr. Michaela Heaton who is referred for evaluation of persistent atrial fibrillation.  Her past medical history is significant for atrial flutter which was ablated in 2017.  She also has valvular heart disease with mild AR, mild-moderate MR and moderate TR.  She has had persistent atrial fibrillation and a holter monitor 2/2020 showed continuous AF with a range of 53-166bpm.  She has been on rate control with metoprolol and anticoagulation with Eliquis.  However, her BP has been relatively low with systolics in the 90's prohibiting uptitration of her B blocker.    Her cardiac evaluation has included an echo 2/2020 showed an EF of 50%, mild-moderate MR with severe LAE, moderate TR with moderate SUKUMAR.    She had an ischemic evaluation in January 2017 which revealed a small distal septal wall reversible defect.     Today, she denies any chest pain, dyspnea or palpitations but does have some fatigue.  She has not been aware of her pulse rate being fast.      The following portions of the patient's history were reviewed and updated as appropriate: allergies, current medications, past family history, past medical history, past social history, past surgical history and problem list.    Problem List:  Patient Active Problem List   Diagnosis   • Uterovaginal prolapse   • Status post catheter ablation of atrial flutter   • Dyspnea on exertion   • Mitral regurgitation   • Tricuspid regurgitation   • Pulmonary hypertension (CMS/HCC)   • Palpitations   • Atrial fibrillation, persistent (CMS/HCC)   • History of nuclear stress test   • Valvular heart disease       Past Medical History:  Past Medical History:   Diagnosis Date   • Anticoagulated     XARELTO. FOR A FIB. WILL CHECK WITH DR LIANG WHEN TO STOP   • Atrial fibrillation and flutter (CMS/HCC)      HAD FLUTTER ABLATION.    • CRF (chronic renal failure), stage 3 (moderate) (CMS/HCC)    • Female bladder prolapse    • High cholesterol    • History of cellulitis     LOWER EXTREMITIES   • History of UTI     RECENTLY HAD ONE. JUST COMPLETED MACRODANTIN LAST WEEK   • Macular degeneration    • Presence of pessary     REMOVED IN JUNE 2018   • Vaginal prolapse    • VHD (valvular heart disease)        Past Surgical History:  Past Surgical History:   Procedure Laterality Date   • ABLATION OF DYSRHYTHMIC FOCUS     • CARDIAC ABLATION      FOR A FLUTTER   • CATARACT EXTRACTION Bilateral    • CHOLECYSTECTOMY     • D&C HYSTEROSCOPY N/A 6/6/2017    Procedure: DILATATION AND CURETTAGE HYSTEROSCOPY;  Surgeon: Andres Lambert MD;  Location: [facility];  Service:    • VAGINAL HYSTERECTOMY N/A 9/27/2018    Procedure: TOTAL VAGINAL HYSTERECTOMY UTEROSACRAL LIGAMENT SUSPENSION;  Surgeon: Andres Lambert MD;  Location: [facility];  Service: Gynecology       Social History:  Social History     Socioeconomic History   • Marital status:      Spouse name: Not on file   • Number of children: Not on file   • Years of education: Not on file   • Highest education level: Not on file   Tobacco Use   • Smoking status: Never Smoker   • Smokeless tobacco: Never Used   Substance and Sexual Activity   • Alcohol use: No   • Drug use: No   • Sexual activity: Defer       Allergies:  Allergies   Allergen Reactions   • Stadol [Butorphanol] Shortness Of Breath       Immunizations:  Immunization History   Administered Date(s) Administered   • Tdap 11/21/2016       ROS:  Review of Systems   Constitution: Positive for malaise/fatigue.   Cardiovascular: Negative for chest pain, dyspnea on exertion, irregular heartbeat, leg swelling, near-syncope, orthopnea, palpitations, paroxysmal nocturnal dyspnea and syncope.   Respiratory: Negative for shortness of breath.    All other systems reviewed and are negative.         Objective:         BP  "132/84 (BP Location: Left arm, Patient Position: Sitting, Cuff Size: Large Adult)   Pulse 102   Resp 18   Ht 160 cm (63\")   Wt 74.4 kg (164 lb)   SpO2 99%   BMI 29.05 kg/m²     Constitutional:       General: Not in acute distress.     Appearance: Well-developed.   Eyes:      General: No scleral icterus.     Conjunctiva/sclera: Conjunctivae normal.      Pupils: Pupils are equal, round, and reactive to light.   HENT:      Head: Normocephalic and atraumatic.   Neck:      Musculoskeletal: Normal range of motion.      Thyroid: No thyromegaly.   Pulmonary:      Effort: Pulmonary effort is normal.      Breath sounds: Normal breath sounds.   Cardiovascular:      Normal rate. Regular rhythm.   Abdominal:      General: Bowel sounds are normal.      Palpations: Abdomen is soft.   Musculoskeletal: Normal range of motion.   Skin:     General: Skin is warm and dry.   Neurological:      Mental Status: Alert and oriented to person, place, and time.         In-Office Procedure(s):  Procedures    ASCVD RIsk Score::  The ASCVD Risk score (Deshawn NAM Jr., et al., 2013) failed to calculate for the following reasons:    The 2013 ASCVD risk score is only valid for ages 40 to 79    Recent Radiology:  Imaging Results (Most Recent)     None          Lab Review:   No visits with results within 2 Month(s) from this visit.   Latest known visit with results is:   Hospital Outpatient Visit on 02/27/2020   Component Date Value   • BSA 02/27/2020 1.8    • IVSd 02/27/2020 0.9    • LVIDd 02/27/2020 3.8    • LVIDs 02/27/2020 2.8    • LVPWd 02/27/2020 1.1    • IVS/LVPW 02/27/2020 0.82    • FS 02/27/2020 26.3    • EDV(Teich) 02/27/2020 62.0    • ESV(Teich) 02/27/2020 29.6    • EF(Teich) 02/27/2020 52.3    • EDV(cubed) 02/27/2020 54.9    • ESV(cubed) 02/27/2020 22.0    • EF(cubed) 02/27/2020 60.0    • LV mass(C)d 02/27/2020 117.3    • LV mass(C)dI 02/27/2020 66.5    • SV(Teich) 02/27/2020 32.4    • SI(Teich) 02/27/2020 18.4    • SV(cubed) 02/27/2020 " 32.9    • SI(cubed) 02/27/2020 18.7    • Ao root diam 02/27/2020 2.9    • Ao root area 02/27/2020 6.6    • ACS 02/27/2020 1.6    • LVOT diam 02/27/2020 1.8    • LVOT area 02/27/2020 2.5    • LVOT area(traced) 02/27/2020 2.5    • LVLd ap4 02/27/2020 5.7    • EDV(MOD-sp4) 02/27/2020 37.0    • LVLs ap4 02/27/2020 5.1    • ESV(MOD-sp4) 02/27/2020 18.0    • EF(MOD-sp4) 02/27/2020 51.4    • LVLd ap2 02/27/2020 5.2    • EDV(MOD-sp2) 02/27/2020 42.0    • LVLs ap2 02/27/2020 4.8    • ESV(MOD-sp2) 02/27/2020 22.0    • EF(MOD-sp2) 02/27/2020 47.6    • SV(MOD-sp4) 02/27/2020 19.0    • SI(MOD-sp4) 02/27/2020 10.8    • SV(MOD-sp2) 02/27/2020 20.0    • SI(MOD-sp2) 02/27/2020 11.3    • Ao root area (BSA correc* 02/27/2020 1.6    • LV Hardwick Vol (BSA correct* 02/27/2020 21.0    • LV Sys Vol (BSA correcte* 02/27/2020 10.2    • TAPSE (>1.6) 02/27/2020 1.9    • MV E max christa 02/27/2020 75.2    • MV V2 max 02/27/2020 79.6    • MV max PG 02/27/2020 2.5    • MV V2 mean 02/27/2020 41.0    • MV mean PG 02/27/2020 1.0    • MV V2 VTI 02/27/2020 12.4    • MVA(VTI) 02/27/2020 2.6    • MV P1/2t max christa 02/27/2020 80.8    • MV P1/2t 02/27/2020 35.7    • MVA(P1/2t) 02/27/2020 6.2    • MV dec slope 02/27/2020 662.0    • MV dec time 02/27/2020 155    • Ao pk christa 02/27/2020 98.1    • Ao max PG 02/27/2020 3.8    • Ao max PG (full) 02/27/2020 1.9    • Ao V2 mean 02/27/2020 56.3    • Ao mean PG 02/27/2020 2.0    • Ao mean PG (full) 02/27/2020 1.0    • Ao V2 VTI 02/27/2020 18.4    • BLAINE(I,A) 02/27/2020 1.7    • BLAINE(I,D) 02/27/2020 1.7    • BLAINE(V,A) 02/27/2020 1.8    • BLAINE(V,D) 02/27/2020 1.8    • LV V1 max PG 02/27/2020 1.9    • LV V1 mean PG 02/27/2020 1.0    • LV V1 max 02/27/2020 69.5    • LV V1 mean 02/27/2020 43.0    • LV V1 VTI 02/27/2020 12.6    • SV(Ao) 02/27/2020 121.5    • SI(Ao) 02/27/2020 68.9    • SV(LVOT) 02/27/2020 32.1    • SI(LVOT) 02/27/2020 18.2    • PA V2 max 02/27/2020 57.0    • PA max PG 02/27/2020 1.3    • PA max PG (full) 02/27/2020  0.07    • PA acc time 02/27/2020 0.09    • RV V1 max PG 02/27/2020 1.2    • RV V1 mean PG 02/27/2020 1.0    • RV V1 max 02/27/2020 55.5    • RV V1 mean 02/27/2020 34.3    • RV V1 VTI 02/27/2020 8.1    • TR max christa 02/27/2020 238.0    • RVSP(TR) 02/27/2020 30.7    • RAP systole 02/27/2020 8.0    • PA pr(Accel) 02/27/2020 39.9    • MVA P1/2T LCG 02/27/2020 2.7    • BH CV ECHO ANA - BZI_BMI 02/27/2020 28.5    • BH CV ECHO ANA - BSA(HA* 02/27/2020 1.8    • BH CV ECHO ANA - BZI_ME* 02/27/2020 73.0    • BH CV ECHO ANA - BZI_ME* 02/27/2020 160.0    • Target HR (85%) 02/27/2020 115    • Max. Pred. HR (100%) 02/27/2020 135    • RV Base 02/27/2020 2.80    • RV Length 02/27/2020 6.10    • RV Mid 02/27/2020 2.90    • LA Volume Index 02/27/2020 48.0    • EF(MOD-bp) 02/27/2020 51.0    • Abdo Ao Diam 02/27/2020 2.20                 Assessment:          Diagnosis Plan   1. Atrial fibrillation, persistent (CMS/HCC)     2. Status post catheter ablation of atrial flutter     3. Valvular heart disease            Plan:      1. Long standing persistent AF - rates not well controlled and BP prohibits up titration of metoprolol. I doubt that we would be able to get her back in to sinus rhythm and maintain it based upon the degree of atrial enlargement.  Discussed indications, risks and benefits of AV node ablation and pacemaker, however, at this time she is reluctant due to the ongoing pandemic.  She prefers to wait and followup with Dr. Heaton.       Level of Care:                 Kosta Tadeo MD  01/18/21  .

## 2021-02-01 DIAGNOSIS — I48.0 PAROXYSMAL ATRIAL FIBRILLATION (HCC): ICD-10-CM

## 2021-02-01 RX ORDER — METOPROLOL SUCCINATE 25 MG/1
TABLET, EXTENDED RELEASE ORAL
Qty: 90 TABLET | Refills: 1 | Status: SHIPPED | OUTPATIENT
Start: 2021-02-01 | End: 2021-11-29

## 2021-04-08 ENCOUNTER — OFFICE VISIT (OUTPATIENT)
Dept: CARDIOLOGY | Facility: CLINIC | Age: 86
End: 2021-04-08

## 2021-04-08 VITALS
RESPIRATION RATE: 18 BRPM | WEIGHT: 163.6 LBS | BODY MASS INDEX: 28.99 KG/M2 | OXYGEN SATURATION: 98 % | HEIGHT: 63 IN | SYSTOLIC BLOOD PRESSURE: 122 MMHG | DIASTOLIC BLOOD PRESSURE: 82 MMHG | HEART RATE: 80 BPM

## 2021-04-08 DIAGNOSIS — I48.21 PERMANENT ATRIAL FIBRILLATION (HCC): Primary | ICD-10-CM

## 2021-04-08 DIAGNOSIS — I38 VALVULAR HEART DISEASE: Chronic | ICD-10-CM

## 2021-04-08 DIAGNOSIS — R06.09 DYSPNEA ON EXERTION: Chronic | ICD-10-CM

## 2021-04-08 DIAGNOSIS — I48.19 ATRIAL FIBRILLATION, PERSISTENT (HCC): Chronic | ICD-10-CM

## 2021-04-08 PROCEDURE — 99214 OFFICE O/P EST MOD 30 MIN: CPT | Performed by: INTERNAL MEDICINE

## 2021-04-08 NOTE — PROGRESS NOTES
"Chief Complaint  Palpitations and Atrial Fibrillation    Subjective    History of Present Illness      I saw Ms. Jacques in the office today. She is a pleasant 86-year-old female with a history of atrial fibrillation, originally diagnosed in 2017.  She was cardioverted to sinus rhythm.  She then developed atrial flutter and underwent atrial flutter ablation in 2017.  She had an echocardiogram in February 2020 which demonstrated an ejection fraction of 50% with a severely dilated left atrium.  She had mild aortic insufficiency, mild to moderate mitral insufficiency and moderate tricuspid insufficiency.  She had an ischemic evaluation in January 2017 which revealed a small distal septal wall reversible defect.  Was felt to be a low risk study.  In the past she has had episodes of bradycardia.  A Holter monitor in February 2020 revealed atrial fibrillation throughout the monitor with rates varying from 53 to 166 bpm.  She did see  in January 2021 NT felt that AV node ablation with pacemaker placement would probably be her best option.  I have not been able to titrate her beta-blockade prior due to episodes of hypotension.  She states that she continues to feel episodes of rapid heartbeat and become short of air with minimal exertion.  No chest tightness or heaviness.  When she saw Dr. Tadeo, she was not ready to commit to have the procedure performed.  After our visit today, she is willing to proceed.  She states that her quality of life has significantly declined    Objective   Vital Signs:   /82 (BP Location: Right arm, Patient Position: Sitting, Cuff Size: Adult)   Pulse 80   Resp 18   Ht 160 cm (63\")   Wt 74.2 kg (163 lb 9.6 oz)   SpO2 98%   BMI 28.98 kg/m²     Vitals and nursing note reviewed.   Constitutional:       General: Not in acute distress.     Appearance: Healthy appearance. Well-developed and not in distress. Not diaphoretic.   Eyes:      General: No scleral icterus.     " Conjunctiva/sclera: Conjunctivae normal.      Pupils: Pupils are equal, round, and reactive to light.   HENT:      Head: Normocephalic and atraumatic.   Neck:      Thyroid: No thyromegaly.      Lymphadenopathy: No cervical adenopathy.   Pulmonary:      Effort: Pulmonary effort is normal. No respiratory distress.      Breath sounds: Normal breath sounds. No wheezing. No rales.   Cardiovascular:      PMI at left midclavicular line. Tachycardia present. Irregularly irregular rhythm. Normal S1. Normal S2.      Murmurs:  There is a 1/6 systolic murmur heard at the mid left sternal border      No gallop. No S3 and S4 gallop. No click. No rub.   Pulses:     Intact distal pulses. No decreased pulses.   Edema:     Peripheral edema absent.   Abdominal:      General: Bowel sounds are normal. There is no distension.      Palpations: Abdomen is soft. There is no abdominal mass.      Tenderness: There is no abdominal tenderness. There is no guarding or rebound.   Musculoskeletal: Normal range of motion.      Cervical back: Normal range of motion and neck supple. Skin:     General: Skin is warm and dry.      Coloration: Skin is not pale.      Findings: No rash.   Neurological:      Mental Status: Alert, oriented to person, place, and time and oriented to person, place and time.      Coordination: Coordination normal.      Gait: Gait is intact.   Psychiatric:         Behavior: Behavior normal.         Result Review :   The following data was reviewed by: Michaela Heaton MD on 04/08/2021:                          Assessment and Plan    1. Permanent atrial fibrillation (CMS/HCC)  She is symptomatic from her atrial fibrillation.  She is now agreeable to pursuing AV node ablation with permanent pacemaker implantation.  We will schedule with Dr. Tadeo.  I have increased her Eliquis to 5 mg twice daily.  She weighs above 60 kg and her creatinine is normal.    2. Valvular heart disease  She had an echocardiogram in February 2020.  There  is mild to moderate mitral insufficiency.  There is moderate tricuspid insufficiency.  Her ejection fraction was 51%.  I am going to repeat  her echocardiogram for ongoing assessment of her ejection fraction    3.  Dyspnea on exertion  I suspect that her dyspnea on exertion is related to her atrial fibrillation in the periodic rapid rates that she is experiencing.  I am going to repeat her echocardiogram to see that her ejection fraction has remained in the low normal range.            Follow Up   No follow-ups on file.  Patient was given instructions and counseling regarding her condition or for health maintenance advice. Please see specific information pulled into the AVS if appropriate.

## 2021-04-12 DIAGNOSIS — I48.21 PERMANENT ATRIAL FIBRILLATION (HCC): Primary | ICD-10-CM

## 2021-04-13 DIAGNOSIS — Z01.818 PREOP TESTING: ICD-10-CM

## 2021-04-13 DIAGNOSIS — I48.21 PERMANENT ATRIAL FIBRILLATION (HCC): Primary | ICD-10-CM

## 2021-04-14 ENCOUNTER — TRANSCRIBE ORDERS (OUTPATIENT)
Dept: ADMINISTRATIVE | Facility: HOSPITAL | Age: 86
End: 2021-04-14

## 2021-04-14 DIAGNOSIS — Z01.818 OTHER SPECIFIED PRE-OPERATIVE EXAMINATION: Primary | ICD-10-CM

## 2021-04-16 ENCOUNTER — TELEPHONE (OUTPATIENT)
Dept: CARDIOLOGY | Facility: CLINIC | Age: 86
End: 2021-04-16

## 2021-04-16 NOTE — TELEPHONE ENCOUNTER
CPA  Jannie/Emily/Natty   Pt called questioning if her scheduled Echo is an annual test because she's having an ablation and if so would like to hold off.    PT CB# [phone]

## 2021-04-19 ENCOUNTER — LAB (OUTPATIENT)
Dept: LAB | Facility: HOSPITAL | Age: 86
End: 2021-04-19

## 2021-04-19 DIAGNOSIS — Z01.818 OTHER SPECIFIED PRE-OPERATIVE EXAMINATION: ICD-10-CM

## 2021-04-19 PROCEDURE — U0005 INFEC AGEN DETEC AMPLI PROBE: HCPCS

## 2021-04-19 PROCEDURE — U0004 COV-19 TEST NON-CDC HGH THRU: HCPCS

## 2021-04-19 PROCEDURE — C9803 HOPD COVID-19 SPEC COLLECT: HCPCS

## 2021-04-20 ENCOUNTER — LAB (OUTPATIENT)
Dept: LAB | Facility: HOSPITAL | Age: 86
End: 2021-04-20

## 2021-04-20 DIAGNOSIS — Z01.818 PREOP TESTING: ICD-10-CM

## 2021-04-20 DIAGNOSIS — I48.21 PERMANENT ATRIAL FIBRILLATION (HCC): ICD-10-CM

## 2021-04-20 LAB
ALBUMIN SERPL-MCNC: 3.8 G/DL (ref 3.5–5.2)
ALBUMIN/GLOB SERPL: 1.5 G/DL
ALP SERPL-CCNC: 57 U/L (ref 39–117)
ALT SERPL W P-5'-P-CCNC: 19 U/L (ref 1–33)
ANION GAP SERPL CALCULATED.3IONS-SCNC: 9.2 MMOL/L (ref 5–15)
AST SERPL-CCNC: 19 U/L (ref 1–32)
BASOPHILS # BLD AUTO: 0.03 10*3/MM3 (ref 0–0.2)
BASOPHILS NFR BLD AUTO: 0.5 % (ref 0–1.5)
BILIRUB SERPL-MCNC: 0.3 MG/DL (ref 0–1.2)
BUN SERPL-MCNC: 22 MG/DL (ref 8–23)
BUN/CREAT SERPL: 18.8 (ref 7–25)
CALCIUM SPEC-SCNC: 9 MG/DL (ref 8.6–10.5)
CHLORIDE SERPL-SCNC: 106 MMOL/L (ref 98–107)
CO2 SERPL-SCNC: 26.8 MMOL/L (ref 22–29)
CREAT SERPL-MCNC: 1.17 MG/DL (ref 0.57–1)
DEPRECATED RDW RBC AUTO: 43.2 FL (ref 37–54)
EOSINOPHIL # BLD AUTO: 0.06 10*3/MM3 (ref 0–0.4)
EOSINOPHIL NFR BLD AUTO: 1.1 % (ref 0.3–6.2)
ERYTHROCYTE [DISTWIDTH] IN BLOOD BY AUTOMATED COUNT: 13.4 % (ref 12.3–15.4)
GFR SERPL CREATININE-BSD FRML MDRD: 44 ML/MIN/1.73
GLOBULIN UR ELPH-MCNC: 2.6 GM/DL
GLUCOSE SERPL-MCNC: 86 MG/DL (ref 65–99)
HCT VFR BLD AUTO: 40.5 % (ref 34–46.6)
HGB BLD-MCNC: 13.5 G/DL (ref 12–15.9)
IMM GRANULOCYTES # BLD AUTO: 0.02 10*3/MM3 (ref 0–0.05)
IMM GRANULOCYTES NFR BLD AUTO: 0.4 % (ref 0–0.5)
INR PPP: 1.14 (ref 0.9–1.1)
LYMPHOCYTES # BLD AUTO: 2.18 10*3/MM3 (ref 0.7–3.1)
LYMPHOCYTES NFR BLD AUTO: 38.3 % (ref 19.6–45.3)
MCH RBC QN AUTO: 29.6 PG (ref 26.6–33)
MCHC RBC AUTO-ENTMCNC: 33.3 G/DL (ref 31.5–35.7)
MCV RBC AUTO: 88.8 FL (ref 79–97)
MONOCYTES # BLD AUTO: 0.59 10*3/MM3 (ref 0.1–0.9)
MONOCYTES NFR BLD AUTO: 10.4 % (ref 5–12)
NEUTROPHILS NFR BLD AUTO: 2.81 10*3/MM3 (ref 1.7–7)
NEUTROPHILS NFR BLD AUTO: 49.3 % (ref 42.7–76)
NRBC BLD AUTO-RTO: 0 /100 WBC (ref 0–0.2)
PLATELET # BLD AUTO: 172 10*3/MM3 (ref 140–450)
PMV BLD AUTO: 11.1 FL (ref 6–12)
POTASSIUM SERPL-SCNC: 5 MMOL/L (ref 3.5–5.2)
PROT SERPL-MCNC: 6.4 G/DL (ref 6–8.5)
PROTHROMBIN TIME: 14.4 SECONDS (ref 11.7–14.2)
RBC # BLD AUTO: 4.56 10*6/MM3 (ref 3.77–5.28)
SARS-COV-2 RNA RESP QL NAA+PROBE: NOT DETECTED
SODIUM SERPL-SCNC: 142 MMOL/L (ref 136–145)
WBC # BLD AUTO: 5.69 10*3/MM3 (ref 3.4–10.8)

## 2021-04-20 PROCEDURE — 36415 COLL VENOUS BLD VENIPUNCTURE: CPT

## 2021-04-20 PROCEDURE — 85610 PROTHROMBIN TIME: CPT

## 2021-04-20 PROCEDURE — 80053 COMPREHEN METABOLIC PANEL: CPT

## 2021-04-20 PROCEDURE — 85025 COMPLETE CBC W/AUTO DIFF WBC: CPT

## 2021-04-21 ENCOUNTER — APPOINTMENT (OUTPATIENT)
Dept: GENERAL RADIOLOGY | Facility: HOSPITAL | Age: 86
End: 2021-04-21

## 2021-04-21 ENCOUNTER — HOSPITAL ENCOUNTER (OUTPATIENT)
Facility: HOSPITAL | Age: 86
Discharge: HOME | End: 2021-04-22
Attending: INTERNAL MEDICINE | Admitting: INTERNAL MEDICINE

## 2021-04-21 DIAGNOSIS — I48.21 PERMANENT ATRIAL FIBRILLATION (HCC): ICD-10-CM

## 2021-04-21 PROCEDURE — 25010000002 FENTANYL CITRATE (PF) 100 MCG/2ML SOLUTION: Performed by: INTERNAL MEDICINE

## 2021-04-21 PROCEDURE — C1889 IMPLANT/INSERT DEVICE, NOC: HCPCS | Performed by: INTERNAL MEDICINE

## 2021-04-21 PROCEDURE — 63710000001 METOPROLOL SUCCINATE XL 25 MG TABLET SUSTAINED-RELEASE 24 HOUR: Performed by: INTERNAL MEDICINE

## 2021-04-21 PROCEDURE — 93650 ICAR CATH ABLTJ AV NODE FUNC: CPT | Performed by: INTERNAL MEDICINE

## 2021-04-21 PROCEDURE — 25010000002 MIDAZOLAM PER 1 MG: Performed by: INTERNAL MEDICINE

## 2021-04-21 PROCEDURE — G0378 HOSPITAL OBSERVATION PER HR: HCPCS

## 2021-04-21 PROCEDURE — 93005 ELECTROCARDIOGRAM TRACING: CPT | Performed by: INTERNAL MEDICINE

## 2021-04-21 PROCEDURE — 33207 INSERT HEART PM VENTRICULAR: CPT | Performed by: INTERNAL MEDICINE

## 2021-04-21 PROCEDURE — C1733 CATH, EP, OTHR THAN COOL-TIP: HCPCS | Performed by: INTERNAL MEDICINE

## 2021-04-21 PROCEDURE — A9270 NON-COVERED ITEM OR SERVICE: HCPCS | Performed by: INTERNAL MEDICINE

## 2021-04-21 PROCEDURE — 25010000003 CEFAZOLIN IN DEXTROSE 2-4 GM/100ML-% SOLUTION: Performed by: INTERNAL MEDICINE

## 2021-04-21 PROCEDURE — C1894 INTRO/SHEATH, NON-LASER: HCPCS | Performed by: INTERNAL MEDICINE

## 2021-04-21 PROCEDURE — 71045 X-RAY EXAM CHEST 1 VIEW: CPT

## 2021-04-21 PROCEDURE — 99152 MOD SED SAME PHYS/QHP 5/>YRS: CPT | Performed by: INTERNAL MEDICINE

## 2021-04-21 PROCEDURE — C1898 LEAD, PMKR, OTHER THAN TRANS: HCPCS | Performed by: INTERNAL MEDICINE

## 2021-04-21 PROCEDURE — 99153 MOD SED SAME PHYS/QHP EA: CPT | Performed by: INTERNAL MEDICINE

## 2021-04-21 PROCEDURE — C1786 PMKR, SINGLE, RATE-RESP: HCPCS | Performed by: INTERNAL MEDICINE

## 2021-04-21 PROCEDURE — 63710000001 CEPHALEXIN 500 MG CAPSULE: Performed by: INTERNAL MEDICINE

## 2021-04-21 PROCEDURE — 25010000003 LIDOCAINE 1 % SOLUTION: Performed by: INTERNAL MEDICINE

## 2021-04-21 PROCEDURE — 63710000001 FUROSEMIDE 20 MG TABLET: Performed by: INTERNAL MEDICINE

## 2021-04-21 DEVICE — LD PM TENDRIL STS 6F52CM 2088TC52: Type: IMPLANTABLE DEVICE | Status: FUNCTIONAL

## 2021-04-21 DEVICE — GEN PM ASSURITY SR RF PM1272: Type: IMPLANTABLE DEVICE | Status: FUNCTIONAL

## 2021-04-21 DEVICE — FLOSEAL HEMOSTATIC MATRIX, 10ML
Type: IMPLANTABLE DEVICE | Status: FUNCTIONAL
Brand: FLOSEAL HEMOSTATIC MATRIX

## 2021-04-21 RX ORDER — FUROSEMIDE 20 MG/1
20 TABLET ORAL DAILY
Status: DISCONTINUED | OUTPATIENT
Start: 2021-04-21 | End: 2021-04-22 | Stop reason: HOSPADM

## 2021-04-21 RX ORDER — SODIUM CHLORIDE 0.9 % (FLUSH) 0.9 %
1-10 SYRINGE (ML) INJECTION AS NEEDED
Status: DISCONTINUED | OUTPATIENT
Start: 2021-04-21 | End: 2021-04-22 | Stop reason: HOSPADM

## 2021-04-21 RX ORDER — FENTANYL CITRATE 50 UG/ML
INJECTION, SOLUTION INTRAMUSCULAR; INTRAVENOUS AS NEEDED
Status: DISCONTINUED | OUTPATIENT
Start: 2021-04-21 | End: 2021-04-21 | Stop reason: HOSPADM

## 2021-04-21 RX ORDER — SODIUM CHLORIDE 0.9 % (FLUSH) 0.9 %
3 SYRINGE (ML) INJECTION EVERY 12 HOURS SCHEDULED
Status: DISCONTINUED | OUTPATIENT
Start: 2021-04-21 | End: 2021-04-22 | Stop reason: HOSPADM

## 2021-04-21 RX ORDER — CEFAZOLIN SODIUM 2 G/100ML
2 INJECTION, SOLUTION INTRAVENOUS ONCE
Status: DISCONTINUED | OUTPATIENT
Start: 2021-04-21 | End: 2021-04-21 | Stop reason: HOSPADM

## 2021-04-21 RX ORDER — SODIUM CHLORIDE 0.9 % (FLUSH) 0.9 %
3-10 SYRINGE (ML) INJECTION AS NEEDED
Status: DISCONTINUED | OUTPATIENT
Start: 2021-04-21 | End: 2021-04-22 | Stop reason: HOSPADM

## 2021-04-21 RX ORDER — GLUCOSAMINE/D3/BOSWELLIA SERRA 1500MG-400
5000 TABLET ORAL DAILY
COMMUNITY

## 2021-04-21 RX ORDER — SODIUM CHLORIDE 9 MG/ML
75 INJECTION, SOLUTION INTRAVENOUS CONTINUOUS
Status: DISCONTINUED | OUTPATIENT
Start: 2021-04-21 | End: 2021-04-21

## 2021-04-21 RX ORDER — SODIUM CHLORIDE 0.9 % (FLUSH) 0.9 %
10 SYRINGE (ML) INJECTION AS NEEDED
Status: DISCONTINUED | OUTPATIENT
Start: 2021-04-21 | End: 2021-04-21 | Stop reason: HOSPADM

## 2021-04-21 RX ORDER — MIDAZOLAM HYDROCHLORIDE 1 MG/ML
INJECTION INTRAMUSCULAR; INTRAVENOUS AS NEEDED
Status: DISCONTINUED | OUTPATIENT
Start: 2021-04-21 | End: 2021-04-21 | Stop reason: HOSPADM

## 2021-04-21 RX ORDER — SODIUM CHLORIDE 0.9 % (FLUSH) 0.9 %
3 SYRINGE (ML) INJECTION EVERY 12 HOURS SCHEDULED
Status: DISCONTINUED | OUTPATIENT
Start: 2021-04-21 | End: 2021-04-21 | Stop reason: HOSPADM

## 2021-04-21 RX ORDER — LIDOCAINE HYDROCHLORIDE 10 MG/ML
INJECTION, SOLUTION INFILTRATION; PERINEURAL AS NEEDED
Status: DISCONTINUED | OUTPATIENT
Start: 2021-04-21 | End: 2021-04-21 | Stop reason: HOSPADM

## 2021-04-21 RX ORDER — CEFAZOLIN SODIUM 2 G/100ML
INJECTION, SOLUTION INTRAVENOUS CONTINUOUS PRN
Status: COMPLETED | OUTPATIENT
Start: 2021-04-21 | End: 2021-04-21

## 2021-04-21 RX ORDER — METOPROLOL SUCCINATE 25 MG/1
25 TABLET, EXTENDED RELEASE ORAL DAILY
Status: DISCONTINUED | OUTPATIENT
Start: 2021-04-21 | End: 2021-04-22 | Stop reason: HOSPADM

## 2021-04-21 RX ORDER — CEPHALEXIN 500 MG/1
500 CAPSULE ORAL 3 TIMES DAILY
Qty: 9 CAPSULE | Refills: 0 | Status: SHIPPED | OUTPATIENT
Start: 2021-04-21 | End: 2021-04-24

## 2021-04-21 RX ORDER — POTASSIUM CHLORIDE 750 MG/1
10 TABLET, FILM COATED, EXTENDED RELEASE ORAL AS NEEDED
Status: DISCONTINUED | OUTPATIENT
Start: 2021-04-21 | End: 2021-04-22 | Stop reason: HOSPADM

## 2021-04-21 RX ORDER — ACETAMINOPHEN 325 MG/1
650 TABLET ORAL EVERY 4 HOURS PRN
Status: DISCONTINUED | OUTPATIENT
Start: 2021-04-21 | End: 2021-04-22 | Stop reason: HOSPADM

## 2021-04-21 RX ORDER — SODIUM CHLORIDE 0.9 % (FLUSH) 0.9 %
10 SYRINGE (ML) INJECTION AS NEEDED
Status: DISCONTINUED | OUTPATIENT
Start: 2021-04-21 | End: 2021-04-22 | Stop reason: HOSPADM

## 2021-04-21 RX ORDER — CEPHALEXIN 500 MG/1
500 CAPSULE ORAL EVERY 8 HOURS SCHEDULED
Status: DISCONTINUED | OUTPATIENT
Start: 2021-04-21 | End: 2021-04-22 | Stop reason: HOSPADM

## 2021-04-21 RX ADMIN — CEPHALEXIN 500 MG: 500 CAPSULE ORAL at 21:51

## 2021-04-21 RX ADMIN — SODIUM CHLORIDE, PRESERVATIVE FREE 3 ML: 5 INJECTION INTRAVENOUS at 21:52

## 2021-04-21 RX ADMIN — METOPROLOL SUCCINATE 25 MG: 25 TABLET, EXTENDED RELEASE ORAL at 21:51

## 2021-04-21 RX ADMIN — FUROSEMIDE 20 MG: 20 TABLET ORAL at 21:52

## 2021-04-21 RX ADMIN — SODIUM CHLORIDE 75 ML/HR: 9 INJECTION, SOLUTION INTRAVENOUS at 10:05

## 2021-04-22 VITALS
TEMPERATURE: 98.5 F | RESPIRATION RATE: 16 BRPM | DIASTOLIC BLOOD PRESSURE: 75 MMHG | WEIGHT: 164 LBS | SYSTOLIC BLOOD PRESSURE: 130 MMHG | HEART RATE: 80 BPM | BODY MASS INDEX: 29.06 KG/M2 | HEIGHT: 63 IN | OXYGEN SATURATION: 94 %

## 2021-04-22 PROCEDURE — 93005 ELECTROCARDIOGRAM TRACING: CPT | Performed by: INTERNAL MEDICINE

## 2021-04-22 PROCEDURE — A9270 NON-COVERED ITEM OR SERVICE: HCPCS | Performed by: INTERNAL MEDICINE

## 2021-04-22 PROCEDURE — 99217 PR OBSERVATION CARE DISCHARGE MANAGEMENT: CPT | Performed by: INTERNAL MEDICINE

## 2021-04-22 PROCEDURE — 63710000001 METOPROLOL SUCCINATE XL 25 MG TABLET SUSTAINED-RELEASE 24 HOUR: Performed by: INTERNAL MEDICINE

## 2021-04-22 PROCEDURE — G0378 HOSPITAL OBSERVATION PER HR: HCPCS

## 2021-04-22 PROCEDURE — 63710000001 FUROSEMIDE 20 MG TABLET: Performed by: INTERNAL MEDICINE

## 2021-04-22 PROCEDURE — 63710000001 CEPHALEXIN 500 MG CAPSULE: Performed by: INTERNAL MEDICINE

## 2021-04-22 RX ADMIN — Medication 3 ML: at 09:00

## 2021-04-22 RX ADMIN — METOPROLOL SUCCINATE 25 MG: 25 TABLET, EXTENDED RELEASE ORAL at 08:11

## 2021-04-22 RX ADMIN — CEPHALEXIN 500 MG: 500 CAPSULE ORAL at 06:31

## 2021-04-22 RX ADMIN — SODIUM CHLORIDE, PRESERVATIVE FREE 3 ML: 5 INJECTION INTRAVENOUS at 08:11

## 2021-04-22 RX ADMIN — FUROSEMIDE 20 MG: 20 TABLET ORAL at 08:11

## 2021-04-22 RX ADMIN — CEPHALEXIN 500 MG: 500 CAPSULE ORAL at 13:15

## 2021-04-27 LAB
QT INTERVAL: 431 MS
QT INTERVAL: 440 MS

## 2021-05-02 LAB — QT INTERVAL: 351 MS

## 2021-05-17 ENCOUNTER — OFFICE VISIT (OUTPATIENT)
Dept: CARDIOLOGY | Facility: CLINIC | Age: 86
End: 2021-05-17

## 2021-05-17 VITALS
SYSTOLIC BLOOD PRESSURE: 150 MMHG | HEIGHT: 63 IN | WEIGHT: 159 LBS | HEART RATE: 80 BPM | DIASTOLIC BLOOD PRESSURE: 92 MMHG | BODY MASS INDEX: 28.17 KG/M2

## 2021-05-17 DIAGNOSIS — I48.21 PERMANENT ATRIAL FIBRILLATION (HCC): Chronic | ICD-10-CM

## 2021-05-17 DIAGNOSIS — Z98.890 STATUS POST CATHETER ABLATION OF ATRIAL FLUTTER: Primary | ICD-10-CM

## 2021-05-17 DIAGNOSIS — Z95.0 PRESENCE OF BIVENTRICULAR CARDIAC PACEMAKER: ICD-10-CM

## 2021-05-17 PROCEDURE — 99024 POSTOP FOLLOW-UP VISIT: CPT | Performed by: INTERNAL MEDICINE

## 2021-05-17 PROCEDURE — 93288 INTERROG EVL PM/LDLS PM IP: CPT | Performed by: INTERNAL MEDICINE

## 2021-05-17 NOTE — PROGRESS NOTES
Subjective:     Encounter Date:05/17/2021      Patient ID: Isabel Jacques is a 87 y.o. female.    Chief Complaint:  Chief Complaint   Patient presents with   • Hospital Follow Up Visit       HPI:  Ms Jacques is an 87 yo patient of Dr. Michaela Heaton who is referred for evaluation of persistent atrial fibrillation.  Her past medical history is significant for atrial flutter which was ablated in 2017.  She also has valvular heart disease with mild AR, mild-moderate MR and moderate TR.  She has had persistent atrial fibrillation and a holter monitor 2/2020 showed continuous AF with a range of 53-166bpm.  She has been on rate control with metoprolol and anticoagulation with Eliquis.  However, her BP has been relatively low with systolics in the 90's prohibiting uptitration of her B blocker.     Her cardiac evaluation has included an echo 2/2020 showed an EF of 50%, mild-moderate MR with severe LAE, moderate TR with moderate SUKUMAR.    She had an ischemic evaluation in January 2017 which revealed a small distal septal wall reversible defect.     She underwent AV node ablation and single chamber pacemaker implant 4/21.     Since then, she has felt well without palpitations and improved dyspnea.        The following portions of the patient's history were reviewed and updated as appropriate: allergies, current medications, past family history, past medical history, past social history, past surgical history and problem list.    Problem List:  Patient Active Problem List   Diagnosis   • Uterovaginal prolapse   • Status post catheter ablation of atrial flutter   • Dyspnea on exertion   • Mitral regurgitation   • Tricuspid regurgitation   • Pulmonary hypertension (CMS/HCC)   • Palpitations   • History of nuclear stress test   • Valvular heart disease   • Permanent atrial fibrillation (CMS/HCC)   • Presence of biventricular cardiac pacemaker       Past Medical History:  Past Medical History:   Diagnosis Date   • Anticoagulated      XARELTO. FOR A FIB. WILL CHECK WITH DR LIANG WHEN TO STOP   • Atrial fibrillation and flutter (CMS/HCC)     HAD FLUTTER ABLATION.    • CRF (chronic renal failure), stage 3 (moderate) (CMS/HCC)    • Female bladder prolapse    • High cholesterol    • History of cellulitis     LOWER EXTREMITIES   • History of UTI     RECENTLY HAD ONE. JUST COMPLETED MACRODANTIN LAST WEEK   • Macular degeneration    • Presence of pessary     REMOVED IN JUNE 2018   • Vaginal prolapse    • VHD (valvular heart disease)        Past Surgical History:  Past Surgical History:   Procedure Laterality Date   • CARDIAC ABLATION      FOR A FLUTTER   • CARDIAC ELECTROPHYSIOLOGY PROCEDURE N/A 4/21/2021    Procedure: AV node ablation/pacemaker implant (SJM);  Surgeon: Kosta Tadeo MD;  Location: [facility] INVASIVE LOCATION;  Service: Cardiovascular;  Laterality: N/A;   • CATARACT EXTRACTION Bilateral    • CHOLECYSTECTOMY     • D & C HYSTEROSCOPY N/A 6/6/2017    Procedure: DILATATION AND CURETTAGE HYSTEROSCOPY;  Surgeon: Andres Lambert MD;  Location: [facility];  Service:    • VAGINAL HYSTERECTOMY N/A 9/27/2018    Procedure: TOTAL VAGINAL HYSTERECTOMY UTEROSACRAL LIGAMENT SUSPENSION;  Surgeon: Andres Lambert MD;  Location: [facility];  Service: Gynecology       Social History:  Social History     Socioeconomic History   • Marital status:      Spouse name: Not on file   • Number of children: Not on file   • Years of education: Not on file   • Highest education level: Not on file   Tobacco Use   • Smoking status: Never Smoker   • Smokeless tobacco: Never Used   Substance and Sexual Activity   • Alcohol use: No   • Drug use: No   • Sexual activity: Defer       Allergies:  Allergies   Allergen Reactions   • Stadol [Butorphanol] Shortness Of Breath       Immunizations:  Immunization History   Administered Date(s) Administered   • Tdap 11/21/2016       ROS:  Review of Systems   Constitutional: Negative for malaise/fatigue.  "  Cardiovascular: Negative for chest pain, irregular heartbeat, near-syncope, palpitations and syncope.   Respiratory: Negative for shortness of breath.    All other systems reviewed and are negative.         Objective:         /92   Pulse 80   Ht 160 cm (63\")   Wt 72.1 kg (159 lb)   BMI 28.17 kg/m²     Constitutional:       General: Not in acute distress.     Appearance: Well-developed.   Eyes:      General: No scleral icterus.     Conjunctiva/sclera: Conjunctivae normal.      Pupils: Pupils are equal, round, and reactive to light.   HENT:      Head: Normocephalic and atraumatic.   Neck:      Thyroid: No thyromegaly.   Pulmonary:      Effort: Pulmonary effort is normal.      Breath sounds: Normal breath sounds.   Cardiovascular:      Normal rate. Regular rhythm.   Abdominal:      General: Bowel sounds are normal.      Palpations: Abdomen is soft.   Musculoskeletal: Normal range of motion.      Cervical back: Normal range of motion. Skin:     General: Skin is warm and dry.   Neurological:      Mental Status: Alert and oriented to person, place, and time.         In-Office Procedure(s):  Procedures  Pacemaker Eval interpreted by [facility] [id number]  Battery RAGINI    R wave paced  Threshold 0.5V  Impedance 600 ohms    Events - 100% v paced    ASCVD RIsk Score::  The ASCVD Risk score (Deshawn DC Jr., et al., 2013) failed to calculate for the following reasons:    The 2013 ASCVD risk score is only valid for ages 40 to 79    Recent Radiology:  Imaging Results (Most Recent)     None          Lab Review:   Admission on 04/21/2021, Discharged on 04/22/2021   Component Date Value   • QT Interval 04/21/2021 351    • QT Interval 04/21/2021 431    • QT Interval 04/22/2021 440    Lab on 04/20/2021   Component Date Value   • Protime 04/20/2021 14.4*   • INR 04/20/2021 1.14*   • Glucose 04/20/2021 86    • BUN 04/20/2021 22    • Creatinine 04/20/2021 1.17*   • Sodium 04/20/2021 142    • Potassium 04/20/2021 5.0    • Chloride " 04/20/2021 106    • CO2 04/20/2021 26.8    • Calcium 04/20/2021 9.0    • Total Protein 04/20/2021 6.4    • Albumin 04/20/2021 3.80    • ALT (SGPT) 04/20/2021 19    • AST (SGOT) 04/20/2021 19    • Alkaline Phosphatase 04/20/2021 57    • Total Bilirubin 04/20/2021 0.3    • eGFR Non African Amer 04/20/2021 44*   • Globulin 04/20/2021 2.6    • A/G Ratio 04/20/2021 1.5    • BUN/Creatinine Ratio 04/20/2021 18.8    • Anion Gap 04/20/2021 9.2    • WBC 04/20/2021 5.69    • RBC 04/20/2021 4.56    • Hemoglobin 04/20/2021 13.5    • Hematocrit 04/20/2021 40.5    • MCV 04/20/2021 88.8    • MCH 04/20/2021 29.6    • MCHC 04/20/2021 33.3    • RDW 04/20/2021 13.4    • RDW-SD 04/20/2021 43.2    • MPV 04/20/2021 11.1    • Platelets 04/20/2021 172    • Neutrophil % 04/20/2021 49.3    • Lymphocyte % 04/20/2021 38.3    • Monocyte % 04/20/2021 10.4    • Eosinophil % 04/20/2021 1.1    • Basophil % 04/20/2021 0.5    • Immature Grans % 04/20/2021 0.4    • Neutrophils, Absolute 04/20/2021 2.81    • Lymphocytes, Absolute 04/20/2021 2.18    • Monocytes, Absolute 04/20/2021 0.59    • Eosinophils, Absolute 04/20/2021 0.06    • Basophils, Absolute 04/20/2021 0.03    • Immature Grans, Absolute 04/20/2021 0.02    • nRBC 04/20/2021 0.0    Lab on 04/19/2021   Component Date Value   • SARS-CoV-2 PCR 04/19/2021 Not Detected                 Assessment:          Diagnosis Plan   1. Status post catheter ablation of atrial flutter     2. Permanent atrial fibrillation (CMS/HCC)     3. Presence of biventricular cardiac pacemaker            Plan:      1. Permanent AF - s/p AVN ablation, on apixaban  2. Pacemaker followup- wound well healed, normal device function, enrolled in remote monitoring    RTC 1 year            Level of Care:                 Kosta Tadeo MD  05/17/21  .

## 2021-05-19 PROBLEM — Z95.0 PRESENCE OF BIVENTRICULAR CARDIAC PACEMAKER: Status: ACTIVE | Noted: 2021-05-19

## 2021-06-28 ENCOUNTER — OFFICE VISIT (OUTPATIENT)
Dept: CARDIOLOGY | Facility: CLINIC | Age: 86
End: 2021-06-28

## 2021-06-28 VITALS
HEIGHT: 63 IN | SYSTOLIC BLOOD PRESSURE: 124 MMHG | BODY MASS INDEX: 28.17 KG/M2 | HEART RATE: 65 BPM | DIASTOLIC BLOOD PRESSURE: 76 MMHG | WEIGHT: 159 LBS

## 2021-06-28 DIAGNOSIS — Z98.890 S/P AV NODAL ABLATION: Chronic | ICD-10-CM

## 2021-06-28 DIAGNOSIS — R06.09 DYSPNEA ON EXERTION: Primary | Chronic | ICD-10-CM

## 2021-06-28 DIAGNOSIS — Z95.0 PRESENCE OF CARDIAC PACEMAKER: ICD-10-CM

## 2021-06-28 DIAGNOSIS — I38 VALVULAR HEART DISEASE: Chronic | ICD-10-CM

## 2021-06-28 PROCEDURE — 93288 INTERROG EVL PM/LDLS PM IP: CPT | Performed by: INTERNAL MEDICINE

## 2021-06-28 PROCEDURE — 99215 OFFICE O/P EST HI 40 MIN: CPT | Performed by: INTERNAL MEDICINE

## 2021-06-28 NOTE — PROGRESS NOTES
".  Chief Complaint  Atrial Fibrillation    Subjective    History of Present Illness    I saw Ms. Jacques in the office today. She is a pleasant 86-year-old female with a history of atrial fibrillation, originally diagnosed in 2017.  She was cardioverted to sinus rhythm.  She then developed atrial flutter and underwent atrial flutter ablation in 2017.  She had an echocardiogram in February 2020 which demonstrated an ejection fraction of 50% with a severely dilated left atrium.  She had mild aortic insufficiency, mild to moderate mitral insufficiency and moderate tricuspid insufficiency.  She had an ischemic evaluation in January 2017 which revealed a small distal septal wall reversible defect.  Was felt to be a low risk study.  In the past she has had episodes of bradycardia.  A Holter monitor in February 2020 revealed atrial fibrillation throughout the monitor with rates varying from 53 to 166 bpm.  She did see  in January 2021 NT felt that AV node ablation with pacemaker placement would probably be her best option.  I have not been able to titrate her beta-blockade prior due to episodes of hypotension.     Ms. Jacques underwent AV node ablation and placement of a single-chamber pacemaker on 4/26/2021.  She tolerated the procedure well.  She presents to the office today stating that she has shortness of air with minimal exertion.  She feels weak and tired.  At times she feels midepigastric tightness in association with her shortness of air.  She states her activities are limited secondary to the above-mentioned symptoms.  She does not feel rested or rate any longer.  No significant lower extremity edema.  She sleeps in a chair of choice.  She does not awaken feeling short of air in the middle of the night      Objective   Vital Signs:   /76   Pulse 65   Ht 160 cm (63\")   Wt 72.1 kg (159 lb)   BMI 28.17 kg/m²     Vitals and nursing note reviewed.   Constitutional:       General: Not in acute " distress.     Appearance: Healthy appearance. Well-developed and not in distress. Not diaphoretic.   Eyes:      General: No scleral icterus.     Conjunctiva/sclera: Conjunctivae normal.      Pupils: Pupils are equal, round, and reactive to light.   HENT:      Head: Normocephalic and atraumatic.   Neck:      Thyroid: No thyromegaly.      Lymphadenopathy: No cervical adenopathy.   Pulmonary:      Effort: Pulmonary effort is normal. No respiratory distress.      Breath sounds: Normal breath sounds. No wheezing. No rales.   Cardiovascular:      PMI at left midclavicular line. Regular rhythm. Normal S1. Normal S2.      Murmurs:  There is a 1/6 systolic murmur heard at the mid left sternal border      No gallop. No S3 and S4 gallop. No click. No rub.   Pulses:     Intact distal pulses. No decreased pulses.   Edema:     Peripheral edema ( Trivial left ankle edema) present.  Abdominal:      General: Bowel sounds are normal. There is no distension.      Palpations: Abdomen is soft. There is no abdominal mass.      Tenderness: There is no abdominal tenderness. There is no guarding or rebound.   Musculoskeletal: Normal range of motion.      Cervical back: Normal range of motion and neck supple. Skin:     General: Skin is warm and dry.      Coloration: Skin is not pale.      Findings: No rash.   Neurological:      Mental Status: Alert, oriented to person, place, and time and oriented to person, place and time.      Coordination: Coordination normal.      Gait: Gait is intact.   Psychiatric:         Behavior: Behavior normal.         Result Review :   The following data was reviewed by: Michaela Heaton MD on 06/28/2021:  CMP    CMP 4/20/21   Glucose 86   BUN 22   Creatinine 1.17 (A)   eGFR Non African Am 44 (A)   Sodium 142   Potassium 5.0   Chloride 106   Calcium 9.0   Albumin 3.80   Total Bilirubin 0.3   Alkaline Phosphatase 57   AST (SGOT) 19   ALT (SGPT) 19   (A) Abnormal value            CBC w/diff    CBC w/Diff 4/20/21    WBC 5.69   RBC 4.56   Hemoglobin 13.5   Hematocrit 40.5   MCV 88.8   MCH 29.6   MCHC 33.3   RDW 13.4   Platelets 172   Neutrophil Rel % 49.3   Immature Granulocyte Rel % 0.4   Lymphocyte Rel % 38.3   Monocyte Rel % 10.4   Eosinophil Rel % 1.1   Basophil Rel % 0.5                             Assessment and Plan    1. Dyspnea on exertion  Ms. Jacques presents with continued dyspnea with exertion.  Her heart rate is no longer an issue since she had AV node ablation.  Her most recent ischemic evaluation was in 2017.  I will repeat a nuclear stress test to see if her shortness of breath is an anginal equivalent.    2. S/P AV panda ablation  She underwent AV node ablation and placement of a single-chamber pacemaker on 4/26/2021.    3. Valvular heart disease  I am going to repeat her echocardiogram. Her previous study demonstrated mild to moderate mitral regurgitation.  Moderate tricuspid insufficiency and mild aortic insufficiency.  Her EF was 51%    4. Presence of cardiac pacemaker  Single-chamber pacemaker.  Interrogation in the office today revealed normal function.  She is pacing at a rate of 60 bpm.    Isabel continues to have dyspnea with exertion.  I did reassure her regarding her pacemaker today.  I did review the procedure that was performed.  I did discuss with her that we are going to do a stress test to reassess for myocardial ischemia and an echocardiogram to review her left ventricular function and valvular status.      I spent 50 minutes caring for Isabel on this date of service. This time includes time spent by me in the following activities:preparing for the visit, reviewing tests, obtaining and/or reviewing a separately obtained history, performing a medically appropriate examination and/or evaluation , counseling and educating the patient/family/caregiver, ordering medications, tests, or procedures, documenting information in the medical record and independently interpreting results and communicating  that information with the patient/family/caregiver  Follow Up   No follow-ups on file.  Patient was given instructions and counseling regarding her condition or for health maintenance advice. Please see specific information pulled into the AVS if appropriate.

## 2021-06-30 ENCOUNTER — TELEPHONE (OUTPATIENT)
Dept: CARDIOLOGY | Facility: CLINIC | Age: 86
End: 2021-06-30

## 2021-06-30 NOTE — TELEPHONE ENCOUNTER
Continue to try to walk and see if her shortness of air improves.  If so, we can hold off on the studies.  However if she continues to have significant shortness of air I at least want to proceed with a stress test.  Her iPad should not be interfering with her device unless she is holding it up against the device

## 2021-06-30 NOTE — TELEPHONE ENCOUNTER
Patient called today stating her shortness of breath has gotten some better. She went on an extensive walk yesterday and felt pretty good. She also wants to know if her iPad could've been interfering with her pacemaker? She wants to know what dr Cash opinion is on still needing her stress and echo? Please advise. Thanks!

## 2021-07-06 DIAGNOSIS — I48.21 PERMANENT ATRIAL FIBRILLATION (HCC): ICD-10-CM

## 2021-07-06 NOTE — TELEPHONE ENCOUNTER
PT needs refill on Eliquis 5 mg BID sent to [facility] on [address] [place].

## 2021-07-17 DIAGNOSIS — I48.19 ATRIAL FIBRILLATION, PERSISTENT (HCC): Chronic | ICD-10-CM

## 2021-07-19 RX ORDER — APIXABAN 2.5 MG/1
TABLET, FILM COATED ORAL
Qty: 180 TABLET | Refills: 1 | OUTPATIENT
Start: 2021-07-19

## 2021-07-21 PROCEDURE — 93296 REM INTERROG EVL PM/IDS: CPT | Performed by: INTERNAL MEDICINE

## 2021-07-21 PROCEDURE — 93294 REM INTERROG EVL PM/LDLS PM: CPT | Performed by: INTERNAL MEDICINE

## 2021-11-08 ENCOUNTER — TELEPHONE (OUTPATIENT)
Dept: CARDIOLOGY | Facility: CLINIC | Age: 86
End: 2021-11-08

## 2021-11-08 RX ORDER — POTASSIUM CHLORIDE 750 MG/1
10 TABLET, FILM COATED, EXTENDED RELEASE ORAL AS NEEDED
Qty: 90 TABLET | Refills: 0 | Status: SHIPPED | OUTPATIENT
Start: 2021-11-08 | End: 2022-02-08

## 2021-11-08 NOTE — TELEPHONE ENCOUNTER
Spoke with the patient, with so many PCPs not taking new patients, the best way to see which offices are is to call [phone].

## 2021-11-26 DIAGNOSIS — I48.0 PAROXYSMAL ATRIAL FIBRILLATION (HCC): ICD-10-CM

## 2021-11-29 RX ORDER — METOPROLOL SUCCINATE 25 MG/1
25 TABLET, EXTENDED RELEASE ORAL DAILY
Qty: 90 TABLET | Refills: 0 | Status: SHIPPED | OUTPATIENT
Start: 2021-11-29 | End: 2022-02-23

## 2022-01-15 DIAGNOSIS — I48.21 PERMANENT ATRIAL FIBRILLATION: ICD-10-CM

## 2022-02-07 ENCOUNTER — TELEPHONE (OUTPATIENT)
Dept: CARDIOLOGY | Facility: CLINIC | Age: 87
End: 2022-02-07

## 2022-02-07 NOTE — TELEPHONE ENCOUNTER
CPA pt    Patient tested positive for covid19 on 02/02/2022, states she does not have a primary care and is still feeling very fatigue. Would like to know if Dr. Heaton has any recommendations?    # [phone]

## 2022-02-07 NOTE — TELEPHONE ENCOUNTER
Pt was wanting us to order an infusion for the Covid, I explained that we do not do that & have no idea how to even do that, advised the pt that she should go to an UC or ER.

## 2022-02-08 RX ORDER — POTASSIUM CHLORIDE 750 MG/1
TABLET, FILM COATED, EXTENDED RELEASE ORAL
Qty: 90 TABLET | Refills: 1 | Status: SHIPPED | OUTPATIENT
Start: 2022-02-08 | End: 2022-08-18

## 2022-02-14 DIAGNOSIS — R06.09 DYSPNEA ON EXERTION: ICD-10-CM

## 2022-02-14 RX ORDER — FUROSEMIDE 20 MG/1
TABLET ORAL
Qty: 90 TABLET | Refills: 0 | Status: SHIPPED | OUTPATIENT
Start: 2022-02-14 | End: 2022-08-18

## 2022-02-23 DIAGNOSIS — I48.0 PAROXYSMAL ATRIAL FIBRILLATION: ICD-10-CM

## 2022-02-23 RX ORDER — METOPROLOL SUCCINATE 25 MG/1
25 TABLET, EXTENDED RELEASE ORAL DAILY
Qty: 90 TABLET | Refills: 1 | Status: SHIPPED | OUTPATIENT
Start: 2022-02-23 | End: 2022-03-29

## 2022-03-05 PROBLEM — Z95.0 PRESENCE OF CARDIAC PACEMAKER: Chronic | Status: ACTIVE | Noted: 2021-06-28

## 2022-03-11 PROBLEM — I27.20 PULMONARY HYPERTENSION: Chronic | Status: ACTIVE | Noted: 2019-06-14

## 2022-03-28 NOTE — PROGRESS NOTES
"Chief Complaint  Atrial Fibrillation    Subjective    History of Present Illness      I saw Ms. Jacques in the office today. She is a pleasant 87-year-old female with a history of atrial fibrillation, originally diagnosed in 2017.  She was cardioverted to sinus rhythm.  She then developed atrial flutter and underwent atrial flutter ablation in 2017. She ultimately underwent AV node ablation in April 2021 secondary to persistent atrial fibrillation with tachybradycardia rates.  She had a  Single-chamber pacemaker placed.    She had an echocardiogram and February 2020 which revealed an ejection fraction of 51%.  Severely dilated left atrium.  She had mild calcification of the aortic valve and mild aortic insufficiency.  She had mild to moderate mitral insufficiency.  She had moderate tricuspid insufficiency.    Isabel states that she is still having shortness of air.  She notices this when walking up steps or walking for long distances.  She states that she will have to stop and catch her breath and then symptoms will improve.  No chest tightness or heaviness is noted.  She does take Lasix and this keeps her lower extremity edema under control.  She is on a low-dose of Lasix.  She also states that she has Raynaud's which is bothersome to her.  She is no longer feeling palpitations.  No dizziness or near syncope.  No orthopnea or paroxysmal nocturnal dyspnea.      Objective   Vital Signs:   /72   Pulse 61   Ht 160 cm (63\")   Wt 67.6 kg (149 lb)   BMI 26.39 kg/m²     Vitals and nursing note reviewed.   Constitutional:       General: Not in acute distress.     Appearance: Healthy appearance. Well-developed and not in distress. Not diaphoretic.   Eyes:      General: No scleral icterus.     Conjunctiva/sclera: Conjunctivae normal.      Pupils: Pupils are equal, round, and reactive to light.   HENT:      Head: Normocephalic and atraumatic.   Neck:      Thyroid: No thyromegaly.      Lymphadenopathy: No cervical " adenopathy.   Pulmonary:      Effort: Pulmonary effort is normal. No respiratory distress.      Breath sounds: Normal breath sounds. No wheezing. No rales.   Cardiovascular:      PMI at left midclavicular line. Regular rhythm. Normal S1. Normal S2.      Murmurs:  There is a 1/6 systolic murmur heard at the mid left sternal border      No gallop. No S3 and S4 gallop. No click. No rub.   Pulses:     Intact distal pulses. No decreased pulses.   Edema:     Peripheral edema absent.   Abdominal:      General: Bowel sounds are normal. There is no distension.      Palpations: Abdomen is soft. There is no abdominal mass.      Tenderness: There is no abdominal tenderness. There is no guarding or rebound.   Musculoskeletal: Normal range of motion.      Cervical back: Normal range of motion and neck supple. Skin:     General: Skin is warm and dry.      Coloration: Skin is not pale.      Findings: No rash.   Neurological:      Mental Status: Alert, oriented to person, place, and time and oriented to person, place and time.      Coordination: Coordination normal.      Gait: Gait is intact.   Psychiatric:         Behavior: Behavior normal.         Result Review :                 Assessment and Plan    1. Permanent atrial fibrillation (HCC)  Ms. Jacques has permanent atrial fibrillation and will require long-term anticoagulation.  She is status post AV node ablation, therefore her ventricular rate is controlled.    2. S/P AV panda ablation  She underwent AV node ablation in April 2021.    3. Presence of cardiac pacemaker  She has a single-chamber pacemaker with appropriate function.  She is ventricular pacing greater than 99%.  She has approximate 11-1/2 years remaining on battery life.    4. Screening for cholesterol level  Ms. Jacques has not had any routine lab work and I am going to schedule a lipid profile to be performed    5. Dyspnea on exertion  Isabel continues to complain of dyspnea on exertion.  She actually looks better in  the office today than I have seen her in a couple of years.  Her description of her shortness of air is not nearly as debilitating as it was before her AV node ablation.  I will repeat her echocardiogram because she does have mitral and tricuspid insufficiency.  On one occasion she had elevated pulmonary pressures.    6.  Raynaud's  She states that she is noticing color differential in her hands and toes weekly.  I am going to discontinue her metoprolol as this can exacerbate Raynaud's.  She will monitor her blood pressure and if she requires treatment, can add Cardizem.  At this juncture I think she will be fine without her metoprolol since she has her pacemaker and AV node ablation    Overall, Isabel looks much better today than I have seen her in a couple of years.  She does not have a primary care physician.  She was previously followed by Dr. Briggs, but has not been seen by anyone since Dr. Briggs started [facility].  I have ordered some screening lab work since she is not had anything performed in over a year.  Her most recent lab work was in April 2021.  At that point her creatinine was 1.17 (had been previously normal) and there has been no follow-up.      I spent 42 minutes caring for Isabel on this date of service. This time includes time spent by me in the following activities:preparing for the visit, reviewing tests, obtaining and/or reviewing a separately obtained history, performing a medically appropriate examination and/or evaluation , counseling and educating the patient/family/caregiver, ordering medications, tests, or procedures, documenting information in the medical record and independently interpreting results and communicating that information with the patient/family/caregiver  Follow Up   No follow-ups on file.  Patient was given instructions and counseling regarding her condition or for health maintenance advice. Please see specific information pulled into the AVS if appropriate.

## 2022-03-29 ENCOUNTER — OFFICE VISIT (OUTPATIENT)
Dept: CARDIOLOGY | Facility: CLINIC | Age: 87
End: 2022-03-29

## 2022-03-29 VITALS
DIASTOLIC BLOOD PRESSURE: 72 MMHG | HEART RATE: 61 BPM | HEIGHT: 63 IN | SYSTOLIC BLOOD PRESSURE: 102 MMHG | BODY MASS INDEX: 26.4 KG/M2 | WEIGHT: 149 LBS

## 2022-03-29 DIAGNOSIS — N18.9 CHRONIC KIDNEY DISEASE, UNSPECIFIED CKD STAGE: Chronic | ICD-10-CM

## 2022-03-29 DIAGNOSIS — Z98.890 S/P AV NODAL ABLATION: Chronic | ICD-10-CM

## 2022-03-29 DIAGNOSIS — I48.21 PERMANENT ATRIAL FIBRILLATION: Chronic | ICD-10-CM

## 2022-03-29 DIAGNOSIS — Z13.220 SCREENING FOR CHOLESTEROL LEVEL: ICD-10-CM

## 2022-03-29 DIAGNOSIS — M81.8 OTHER OSTEOPOROSIS WITHOUT CURRENT PATHOLOGICAL FRACTURE: ICD-10-CM

## 2022-03-29 DIAGNOSIS — R06.09 DYSPNEA ON EXERTION: Primary | Chronic | ICD-10-CM

## 2022-03-29 DIAGNOSIS — Z95.0 PRESENCE OF CARDIAC PACEMAKER: Chronic | ICD-10-CM

## 2022-03-29 PROBLEM — I73.00 RAYNAUD'S DISEASE: Status: ACTIVE | Noted: 2022-03-29

## 2022-03-29 PROCEDURE — 99215 OFFICE O/P EST HI 40 MIN: CPT | Performed by: INTERNAL MEDICINE

## 2022-03-29 PROCEDURE — 93288 INTERROG EVL PM/LDLS PM IP: CPT | Performed by: INTERNAL MEDICINE

## 2022-03-30 ENCOUNTER — TELEPHONE (OUTPATIENT)
Dept: CARDIOLOGY | Facility: CLINIC | Age: 87
End: 2022-03-30

## 2022-03-30 NOTE — TELEPHONE ENCOUNTER
3/30/22-Dr Michaela Heaton  Pt: Isabel Jacques  : 1934  Phone: [phone]  Reason for Call:  Pt. Called she stated that she saw Dr Heaton yesterday and she told her to d/c Metoprolol. But didn't tell her to start anything else. I read Dr Jennings's letter she said that she thought that Ms. Jacques would do fine without the Metoprolol -. I told Mrs Jacques to check her B/P for a couple weeks and let us know what it is running. She said she will.

## 2022-04-05 NOTE — ANESTHESIA POSTPROCEDURE EVALUATION
Patient: Isabel Jacques    Procedure Summary     Date Anesthesia Start Anesthesia Stop Room / Location    06/06/17 1425 [id number]  [facility] / [facility]       Procedure Diagnosis Surgeon Provider    DILATATION AND CURETTAGE HYSTEROSCOPY (N/A Uterus) No diagnosis on file. MD Juan Manuel Anguiano MD          Anesthesia Type: general  Last vitals  /67 (06/06/17 1700)    Temp      Pulse 69 (06/06/17 1700)   Resp 16 (06/06/17 1700)    SpO2 100 % (06/06/17 1700)      Post Anesthesia Care and Evaluation    Patient location during evaluation: PACU  Patient participation: complete - patient participated  Level of consciousness: awake  Pain management: adequate  Airway patency: patent  Anesthetic complications: No anesthetic complications    Cardiovascular status: acceptable  Respiratory status: acceptable  Hydration status: acceptable       27.6

## 2022-04-08 ENCOUNTER — LAB (OUTPATIENT)
Dept: LAB | Facility: HOSPITAL | Age: 87
End: 2022-04-08

## 2022-04-08 ENCOUNTER — HOSPITAL ENCOUNTER (OUTPATIENT)
Dept: CARDIOLOGY | Facility: HOSPITAL | Age: 87
Discharge: HOME | End: 2022-04-08

## 2022-04-08 VITALS
SYSTOLIC BLOOD PRESSURE: 154 MMHG | WEIGHT: 149 LBS | DIASTOLIC BLOOD PRESSURE: 83 MMHG | HEART RATE: 74 BPM | HEIGHT: 63 IN | BODY MASS INDEX: 26.4 KG/M2

## 2022-04-08 DIAGNOSIS — I48.21 PERMANENT ATRIAL FIBRILLATION: Chronic | ICD-10-CM

## 2022-04-08 DIAGNOSIS — M81.8 OTHER OSTEOPOROSIS WITHOUT CURRENT PATHOLOGICAL FRACTURE: ICD-10-CM

## 2022-04-08 DIAGNOSIS — R06.09 DYSPNEA ON EXERTION: ICD-10-CM

## 2022-04-08 DIAGNOSIS — N18.9 CHRONIC KIDNEY DISEASE, UNSPECIFIED CKD STAGE: Chronic | ICD-10-CM

## 2022-04-08 DIAGNOSIS — Z13.220 SCREENING FOR CHOLESTEROL LEVEL: ICD-10-CM

## 2022-04-08 LAB
25(OH)D3 SERPL-MCNC: 49.4 NG/ML (ref 30–100)
ALBUMIN SERPL-MCNC: 4.6 G/DL (ref 3.5–5.2)
ALBUMIN/GLOB SERPL: 1.7 G/DL
ALP SERPL-CCNC: 81 U/L (ref 39–117)
ALT SERPL W P-5'-P-CCNC: 24 U/L (ref 1–33)
ANION GAP SERPL CALCULATED.3IONS-SCNC: 11 MMOL/L (ref 5–15)
AST SERPL-CCNC: 23 U/L (ref 1–32)
BASOPHILS # BLD AUTO: 0.04 10*3/MM3 (ref 0–0.2)
BASOPHILS NFR BLD AUTO: 0.6 % (ref 0–1.5)
BILIRUB SERPL-MCNC: 0.5 MG/DL (ref 0–1.2)
BUN SERPL-MCNC: 14 MG/DL (ref 8–23)
BUN/CREAT SERPL: 13.7 (ref 7–25)
CALCIUM SPEC-SCNC: 9.3 MG/DL (ref 8.6–10.5)
CHLORIDE SERPL-SCNC: 101 MMOL/L (ref 98–107)
CHOLEST SERPL-MCNC: 229 MG/DL (ref 0–200)
CO2 SERPL-SCNC: 27 MMOL/L (ref 22–29)
CREAT SERPL-MCNC: 1.02 MG/DL (ref 0.57–1)
DEPRECATED RDW RBC AUTO: 45.8 FL (ref 37–54)
EGFRCR SERPLBLD CKD-EPI 2021: 53.4 ML/MIN/1.73
EOSINOPHIL # BLD AUTO: 0.04 10*3/MM3 (ref 0–0.4)
EOSINOPHIL NFR BLD AUTO: 0.6 % (ref 0.3–6.2)
ERYTHROCYTE [DISTWIDTH] IN BLOOD BY AUTOMATED COUNT: 13.9 % (ref 12.3–15.4)
GLOBULIN UR ELPH-MCNC: 2.7 GM/DL
GLUCOSE SERPL-MCNC: 96 MG/DL (ref 65–99)
HCT VFR BLD AUTO: 42.8 % (ref 34–46.6)
HDLC SERPL-MCNC: 57 MG/DL (ref 40–60)
HGB BLD-MCNC: 13.8 G/DL (ref 12–15.9)
IMM GRANULOCYTES # BLD AUTO: 0.01 10*3/MM3 (ref 0–0.05)
IMM GRANULOCYTES NFR BLD AUTO: 0.2 % (ref 0–0.5)
LDLC SERPL CALC-MCNC: 154 MG/DL (ref 0–100)
LDLC/HDLC SERPL: 2.66 {RATIO}
LYMPHOCYTES # BLD AUTO: 2.77 10*3/MM3 (ref 0.7–3.1)
LYMPHOCYTES NFR BLD AUTO: 43.3 % (ref 19.6–45.3)
MAGNESIUM SERPL-MCNC: 2.1 MG/DL (ref 1.6–2.4)
MCH RBC QN AUTO: 28.7 PG (ref 26.6–33)
MCHC RBC AUTO-ENTMCNC: 32.2 G/DL (ref 31.5–35.7)
MCV RBC AUTO: 89 FL (ref 79–97)
MONOCYTES # BLD AUTO: 0.63 10*3/MM3 (ref 0.1–0.9)
MONOCYTES NFR BLD AUTO: 9.9 % (ref 5–12)
NEUTROPHILS NFR BLD AUTO: 2.9 10*3/MM3 (ref 1.7–7)
NEUTROPHILS NFR BLD AUTO: 45.4 % (ref 42.7–76)
NRBC BLD AUTO-RTO: 0 /100 WBC (ref 0–0.2)
PLATELET # BLD AUTO: 197 10*3/MM3 (ref 140–450)
PMV BLD AUTO: 10.6 FL (ref 6–12)
POTASSIUM SERPL-SCNC: 4 MMOL/L (ref 3.5–5.2)
PROT SERPL-MCNC: 7.3 G/DL (ref 6–8.5)
RBC # BLD AUTO: 4.81 10*6/MM3 (ref 3.77–5.28)
SODIUM SERPL-SCNC: 139 MMOL/L (ref 136–145)
T-UPTAKE NFR SERPL: 1.04 TBI (ref 0.8–1.3)
T4 SERPL-MCNC: 8.48 MCG/DL (ref 4.5–11.7)
TRIGL SERPL-MCNC: 103 MG/DL (ref 0–150)
TSH SERPL DL<=0.05 MIU/L-ACNC: 1.97 UIU/ML (ref 0.27–4.2)
VLDLC SERPL-MCNC: 18 MG/DL (ref 5–40)
WBC NRBC COR # BLD: 6.39 10*3/MM3 (ref 3.4–10.8)

## 2022-04-08 PROCEDURE — 84436 ASSAY OF TOTAL THYROXINE: CPT

## 2022-04-08 PROCEDURE — 80061 LIPID PANEL: CPT

## 2022-04-08 PROCEDURE — 82306 VITAMIN D 25 HYDROXY: CPT

## 2022-04-08 PROCEDURE — 36415 COLL VENOUS BLD VENIPUNCTURE: CPT

## 2022-04-08 PROCEDURE — 80053 COMPREHEN METABOLIC PANEL: CPT

## 2022-04-08 PROCEDURE — 85025 COMPLETE CBC W/AUTO DIFF WBC: CPT

## 2022-04-08 PROCEDURE — 84479 ASSAY OF THYROID (T3 OR T4): CPT

## 2022-04-08 PROCEDURE — 93306 TTE W/DOPPLER COMPLETE: CPT

## 2022-04-08 PROCEDURE — 25010000002 PERFLUTREN (DEFINITY) 8.476 MG IN SODIUM CHLORIDE (PF) 0.9 % 10 ML INJECTION: Performed by: INTERNAL MEDICINE

## 2022-04-08 PROCEDURE — 83735 ASSAY OF MAGNESIUM: CPT

## 2022-04-08 PROCEDURE — 93306 TTE W/DOPPLER COMPLETE: CPT | Performed by: INTERNAL MEDICINE

## 2022-04-08 PROCEDURE — 84443 ASSAY THYROID STIM HORMONE: CPT

## 2022-04-08 RX ADMIN — SODIUM CHLORIDE 3 ML: 9 INJECTION INTRAMUSCULAR; INTRAVENOUS; SUBCUTANEOUS at 15:50

## 2022-04-09 LAB
BH CV ECHO MEAS - ACS: 1.65 CM
BH CV ECHO MEAS - AO MAX PG: 10.9 MMHG
BH CV ECHO MEAS - AO MEAN PG: 5.3 MMHG
BH CV ECHO MEAS - AO ROOT DIAM: 2.43 CM
BH CV ECHO MEAS - AO V2 MAX: 165.4 CM/SEC
BH CV ECHO MEAS - AO V2 VTI: 28.6 CM
BH CV ECHO MEAS - AVA(I,D): 1.71 CM2
BH CV ECHO MEAS - EDV(CUBED): 71.8 ML
BH CV ECHO MEAS - EDV(MOD-SP4): 52 ML
BH CV ECHO MEAS - EF(MOD-SP4): 55.8 %
BH CV ECHO MEAS - ESV(CUBED): 20.2 ML
BH CV ECHO MEAS - ESV(MOD-SP4): 23 ML
BH CV ECHO MEAS - FS: 34.5 %
BH CV ECHO MEAS - IVS/LVPW: 0.89 CM
BH CV ECHO MEAS - IVSD: 1.02 CM
BH CV ECHO MEAS - LA DIMENSION: 4.5 CM
BH CV ECHO MEAS - LV DIASTOLIC VOL/BSA (35-75): 30.5 CM2
BH CV ECHO MEAS - LV MASS(C)D: 150.5 GRAMS
BH CV ECHO MEAS - LV MAX PG: 4.1 MMHG
BH CV ECHO MEAS - LV MEAN PG: 1.95 MMHG
BH CV ECHO MEAS - LV SYSTOLIC VOL/BSA (12-30): 13.5 CM2
BH CV ECHO MEAS - LV V1 MAX: 101.1 CM/SEC
BH CV ECHO MEAS - LV V1 VTI: 19.1 CM
BH CV ECHO MEAS - LVIDD: 4.2 CM
BH CV ECHO MEAS - LVIDS: 2.7 CM
BH CV ECHO MEAS - LVOT AREA: 2.6 CM2
BH CV ECHO MEAS - LVOT DIAM: 1.81 CM
BH CV ECHO MEAS - LVPWD: 1.14 CM
BH CV ECHO MEAS - MR MAX PG: 105.1 MMHG
BH CV ECHO MEAS - MR MAX VEL: 512.5 CM/SEC
BH CV ECHO MEAS - MR MEAN PG: 72.7 MMHG
BH CV ECHO MEAS - MR MEAN VEL: 406.8 CM/SEC
BH CV ECHO MEAS - MR VTI: 170.8 CM
BH CV ECHO MEAS - MV DEC SLOPE: 765.5 CM/SEC2
BH CV ECHO MEAS - MV DEC TIME: 105 MSEC
BH CV ECHO MEAS - MV E MAX VEL: 78.8 CM/SEC
BH CV ECHO MEAS - MV MAX PG: 3.8 MMHG
BH CV ECHO MEAS - MV MEAN PG: 1.13 MMHG
BH CV ECHO MEAS - MV V2 VTI: 28.3 CM
BH CV ECHO MEAS - MVA(VTI): 1.73 CM2
BH CV ECHO MEAS - PA ACC TIME: 0.09 SEC
BH CV ECHO MEAS - PA PR(ACCEL): 37.4 MMHG
BH CV ECHO MEAS - PA V2 MAX: 107.2 CM/SEC
BH CV ECHO MEAS - PI END-D VEL: 86.9 CM/SEC
BH CV ECHO MEAS - RAP SYSTOLE: 3 MMHG
BH CV ECHO MEAS - RV MAX PG: 1.18 MMHG
BH CV ECHO MEAS - RV V1 MAX: 54.4 CM/SEC
BH CV ECHO MEAS - RV V1 VTI: 10.1 CM
BH CV ECHO MEAS - RVOT DIAM: 0.3 CM
BH CV ECHO MEAS - RVSP: 41.2 MMHG
BH CV ECHO MEAS - SI(MOD-SP4): 17 ML/M2
BH CV ECHO MEAS - SV(LVOT): 49 ML
BH CV ECHO MEAS - SV(MOD-SP4): 29 ML
BH CV ECHO MEAS - SV(RVOT): 0.71 ML
BH CV ECHO MEAS - TAPSE (>1.6): 1.87 CM
BH CV ECHO MEAS - TR MAX PG: 38.2 MMHG
BH CV ECHO MEAS - TR MAX VEL: 309.1 CM/SEC
BH CV XLRA - RV BASE: 3.4 CM
BH CV XLRA - RV LENGTH: 6.8 CM
BH CV XLRA - RV MID: 2.5 CM
LEFT ATRIUM VOLUME INDEX: 74.3 ML/M2
MAXIMAL PREDICTED HEART RATE: 133 BPM
SINUS: 2.5 CM
STJ: 2.5 CM
STRESS TARGET HR: 113 BPM

## 2022-04-16 DIAGNOSIS — I48.21 PERMANENT ATRIAL FIBRILLATION: ICD-10-CM

## 2022-04-25 ENCOUNTER — TELEPHONE (OUTPATIENT)
Dept: CARDIOLOGY | Facility: CLINIC | Age: 87
End: 2022-04-25

## 2022-04-25 NOTE — TELEPHONE ENCOUNTER
MYKEOM that we will call with results and suggestions once Dr. Jennings has returned from vacation. Dara

## 2022-04-25 NOTE — TELEPHONE ENCOUNTER
22-Dr Michaela Heaton  Pt: Isabel Jacques  : 1934  Phone: [phone]  Reason for Call:  Pt. Would like her lab work results please

## 2022-08-18 DIAGNOSIS — R06.09 DYSPNEA ON EXERTION: ICD-10-CM

## 2022-08-18 DIAGNOSIS — I48.0 PAROXYSMAL ATRIAL FIBRILLATION: ICD-10-CM

## 2022-08-18 RX ORDER — FUROSEMIDE 20 MG/1
TABLET ORAL
Qty: 90 TABLET | Refills: 1 | Status: SHIPPED | OUTPATIENT
Start: 2022-08-18

## 2022-08-18 RX ORDER — POTASSIUM CHLORIDE 750 MG/1
TABLET, FILM COATED, EXTENDED RELEASE ORAL
Qty: 90 TABLET | Refills: 1 | Status: SHIPPED | OUTPATIENT
Start: 2022-08-18

## 2022-08-18 RX ORDER — METOPROLOL SUCCINATE 25 MG/1
25 TABLET, EXTENDED RELEASE ORAL DAILY
Qty: 90 TABLET | Refills: 1 | OUTPATIENT
Start: 2022-08-18

## 2022-09-06 PROCEDURE — 93296 REM INTERROG EVL PM/IDS: CPT | Performed by: INTERNAL MEDICINE

## 2022-09-06 PROCEDURE — 93294 REM INTERROG EVL PM/LDLS PM: CPT | Performed by: INTERNAL MEDICINE

## 2022-10-21 DIAGNOSIS — I48.21 PERMANENT ATRIAL FIBRILLATION: ICD-10-CM

## 2022-10-21 RX ORDER — APIXABAN 5 MG/1
TABLET, FILM COATED ORAL
Qty: 180 TABLET | Refills: 3 | Status: SHIPPED | OUTPATIENT
Start: 2022-10-21

## 2023-04-24 ENCOUNTER — TELEPHONE (OUTPATIENT)
Dept: CARDIOLOGY | Facility: CLINIC | Age: 88
End: 2023-04-24
Payer: MEDICARE

## 2023-04-24 NOTE — TELEPHONE ENCOUNTER
04/24/2023: I called patient @ [phone] and [phone].  I left msg on VM to see which Cardiologist she is following up with

## 2023-04-26 NOTE — TELEPHONE ENCOUNTER
Spoke with patient.  She is going to discuss cardiologist with her grandson and call me back with her decision.

## 2023-07-05 PROBLEM — Z98.890 STATUS POST CATHETER ABLATION OF ATRIAL FLUTTER: Chronic | Status: RESOLVED | Noted: 2019-06-14 | Resolved: 2023-07-05

## 2023-08-15 ENCOUNTER — PATIENT ROUNDING (BHMG ONLY) (OUTPATIENT)
Dept: INTERNAL MEDICINE | Facility: CLINIC | Age: 88
End: 2023-08-15
Payer: MEDICARE

## 2023-08-15 ENCOUNTER — OFFICE VISIT (OUTPATIENT)
Dept: INTERNAL MEDICINE | Facility: CLINIC | Age: 88
End: 2023-08-15
Payer: MEDICARE

## 2023-08-15 VITALS
BODY MASS INDEX: 25.34 KG/M2 | TEMPERATURE: 98.1 F | SYSTOLIC BLOOD PRESSURE: 132 MMHG | WEIGHT: 143 LBS | HEIGHT: 63 IN | DIASTOLIC BLOOD PRESSURE: 80 MMHG

## 2023-08-15 DIAGNOSIS — Z79.01 ANTICOAGULATED: ICD-10-CM

## 2023-08-15 DIAGNOSIS — M79.89 LEG SWELLING: ICD-10-CM

## 2023-08-15 DIAGNOSIS — E55.9 VITAMIN D DEFICIENCY: ICD-10-CM

## 2023-08-15 DIAGNOSIS — M79.605 LEG PAIN, BILATERAL: Primary | ICD-10-CM

## 2023-08-15 DIAGNOSIS — M79.604 LEG PAIN, BILATERAL: Primary | ICD-10-CM

## 2023-08-15 DIAGNOSIS — R60.0 EXTREMITY EDEMA: ICD-10-CM

## 2023-08-15 DIAGNOSIS — I27.20 PULMONARY HYPERTENSION: Chronic | ICD-10-CM

## 2023-08-15 DIAGNOSIS — I73.00 RAYNAUD'S SYNDROME WITHOUT GANGRENE: ICD-10-CM

## 2023-08-15 PROCEDURE — 1159F MED LIST DOCD IN RCRD: CPT | Performed by: PHYSICIAN ASSISTANT

## 2023-08-15 PROCEDURE — 99204 OFFICE O/P NEW MOD 45 MIN: CPT | Performed by: PHYSICIAN ASSISTANT

## 2023-08-15 PROCEDURE — 1160F RVW MEDS BY RX/DR IN RCRD: CPT | Performed by: PHYSICIAN ASSISTANT

## 2023-08-15 RX ORDER — OMEGA-3/DHA/EPA/FISH OIL 300-1000MG
2 CAPSULE ORAL DAILY
COMMUNITY

## 2023-08-15 RX ORDER — AMLODIPINE BESYLATE 2.5 MG/1
2.5 TABLET ORAL DAILY
Qty: 90 TABLET | Refills: 1 | Status: SHIPPED | OUTPATIENT
Start: 2023-08-15

## 2023-08-15 NOTE — PROGRESS NOTES
Subjective   Chief Complaint   Patient presents with    Leg Pain       History of Present Illness     Pt is here today to establish care as a new patient, referred by Dr. Jennings. She has a hx of Afib, CKD, raynaud's disease and osteoporosis. She had been having some lower extremity swelling, Dr. Jennings increased her Lasix. Some days she will take another dose, other types she will not.   She has some leg pain in both legs that is worse sometimes with walking. She states it feels like her legs are heavy with walking at times. She does have large varicose veins. Sometimes they are painful. Has a hx of raynaud's has not ever been on a CCB for this.      Patient Active Problem List   Diagnosis    Dyspnea on exertion    Mitral regurgitation    Tricuspid regurgitation    Pulmonary hypertension    Permanent atrial fibrillation    Presence of cardiac pacemaker    CKD (chronic kidney disease)    Raynaud's disease       Allergies   Allergen Reactions    Stadol [Butorphanol] Shortness Of Breath       Current Outpatient Medications on File Prior to Visit   Medication Sig Dispense Refill    acetaminophen (TYLENOL) 325 MG tablet Take 2 tablets by mouth Every 4 (Four) Hours As Needed for Mild Pain . 100 tablet 3    Biotin 14682 MCG tablet Take 5,000 mcg by mouth Daily.      Cholecalciferol (VITAMIN D) 10 MCG/ML liquid Take 0.5 mL by mouth As Needed.      Eliquis 5 MG tablet tablet TAKE 1 TABLET BY MOUTH EVERY 12 HOURS 180 tablet 3    fish oil-omega-3 fatty acids 1000 MG capsule Take 2 capsules by mouth Daily.      furosemide (LASIX) 20 MG tablet TAKE 1 TABLET BY MOUTH EVERY DAY 90 tablet 1    Multiple Vitamins-Minerals (PRESERVISION AREDS 2 PO) Take  by mouth.      potassium chloride 10 MEQ CR tablet TAKE 1 TABLET BY MOUTH AS NEEDED. TAKE ONLY WHEN TAKING FUROSEMIDE 90 tablet 1     No current facility-administered medications on file prior to visit.       Past Medical History:   Diagnosis Date    Female bladder prolapse     High  "cholesterol     Macular degeneration     Status post catheter ablation of atrial flutter 06/14/2019 July 2017    Vaginal prolapse     VHD (valvular heart disease)        Family History   Problem Relation Age of Onset    Malig Hyperthermia Neg Hx        Social History     Socioeconomic History    Marital status:    Tobacco Use    Smoking status: Never    Smokeless tobacco: Never   Substance and Sexual Activity    Alcohol use: No    Drug use: No    Sexual activity: Defer       Past Surgical History:   Procedure Laterality Date    CARDIAC ABLATION      FOR A FLUTTER    CARDIAC ELECTROPHYSIOLOGY PROCEDURE N/A 4/21/2021    Procedure: AV node ablation/pacemaker implant (SJM);  Surgeon: Kosta Tadeo MD;  Location: [facility] INVASIVE LOCATION;  Service: Cardiovascular;  Laterality: N/A;    CATARACT EXTRACTION Bilateral     CHOLECYSTECTOMY      D & C HYSTEROSCOPY N/A 6/6/2017    Procedure: DILATATION AND CURETTAGE HYSTEROSCOPY;  Surgeon: Andres Lambert MD;  Location: [facility];  Service:     VAGINAL HYSTERECTOMY N/A 9/27/2018    Procedure: TOTAL VAGINAL HYSTERECTOMY UTEROSACRAL LIGAMENT SUSPENSION;  Surgeon: Andres Lambert MD;  Location: [facility];  Service: Gynecology         The following portions of the patient's history were reviewed and updated as appropriate: problem list, allergies, current medications, past medical history, past family history, past social history, and past surgical history.    ROS    See HPI    Immunization History   Administered Date(s) Administered    Tdap 11/21/2016       Objective   Vitals:    08/15/23 1052   BP: 132/80   Temp: 98.1 øF (36.7 øC)   Weight: 64.9 kg (143 lb)   Height: 160 cm (62.99\")     Body mass index is 25.34 kg/mý.  Physical Exam  Vitals reviewed.   Constitutional:       Appearance: Normal appearance.   HENT:      Head: Normocephalic and atraumatic.   Cardiovascular:      Rate and Rhythm: Normal rate and regular rhythm.      Heart sounds: " Normal heart sounds.   Pulmonary:      Effort: Pulmonary effort is normal.      Breath sounds: Normal breath sounds.   Musculoskeletal:      Right lower leg: No edema.      Left lower leg: No edema.      Comments: Large tortuous varicosities lower extremities.    Neurological:      Mental Status: She is alert.         Assessment & Plan   Diagnoses and all orders for this visit:    1. Leg pain, bilateral (Primary)  -     Doppler Arterial Multi Level Lower Extremity - Bilateral CAR; Future    2. Leg swelling  -     Comprehensive Metabolic Panel  -     Urinalysis With Culture If Indicated -    3. Vitamin D deficiency  -     Vitamin D,25-Hydroxy    4. Anticoagulated  -     CBC & Differential    5. Extremity edema  -     TSH    6. Pulmonary hypertension  -     Doppler Arterial Multi Level Lower Extremity - Bilateral CAR; Future    7. Raynaud's syndrome without gangrene  -     Doppler Arterial Multi Level Lower Extremity - Bilateral CAR; Future  -     amLODIPine (NORVASC) 2.5 MG tablet; Take 1 tablet by mouth Daily.  Dispense: 90 tablet; Refill: 1     Will start low dose Amlodipine for raynaud's see if she has any improvement in her sx. Workup other causes of ongoing edema, though likely vascular related. Will also order ABIs today    Return in about 6 weeks (around 9/26/2023) for Lab Today.

## 2023-08-15 NOTE — PROGRESS NOTES
August 15, 2023    Hello, may I speak with Isabel Jacques?    My name is Natali      I am  with [facility]  [address]  [place] [address].    Before we get started may I verify your date of birth? 1934    I am calling to officially welcome you to our practice and ask about your recent visit. Is this a good time to talk? yes    Tell me about your visit with us. What things went well?  Everything       We're always looking for ways to make our patients' experiences even better. Do you have recommendations on ways we may improve?  no    Overall were you satisfied with your first visit to our practice? yes       I appreciate you taking the time to speak with me today. Is there anything else I can do for you? no      Thank you, and have a great day.

## 2023-08-16 LAB
25(OH)D3+25(OH)D2 SERPL-MCNC: 46.3 NG/ML (ref 30–100)
ALBUMIN SERPL-MCNC: 4.6 G/DL (ref 3.5–5.2)
ALBUMIN/GLOB SERPL: 1.9 G/DL
ALP SERPL-CCNC: 78 U/L (ref 39–117)
ALT SERPL-CCNC: 25 U/L (ref 1–33)
APPEARANCE UR: ABNORMAL
AST SERPL-CCNC: 18 U/L (ref 1–32)
BACTERIA #/AREA URNS HPF: NORMAL /HPF
BASOPHILS # BLD AUTO: 0.05 10*3/MM3 (ref 0–0.2)
BASOPHILS NFR BLD AUTO: 0.8 % (ref 0–1.5)
BILIRUB SERPL-MCNC: 0.6 MG/DL (ref 0–1.2)
BILIRUB UR QL STRIP: NEGATIVE
BUN SERPL-MCNC: 21 MG/DL (ref 8–23)
BUN/CREAT SERPL: 18.3 (ref 7–25)
CALCIUM SERPL-MCNC: 9.8 MG/DL (ref 8.6–10.5)
CASTS URNS QL MICRO: NORMAL /LPF
CHLORIDE SERPL-SCNC: 105 MMOL/L (ref 98–107)
CO2 SERPL-SCNC: 27.6 MMOL/L (ref 22–29)
COLOR UR: YELLOW
CREAT SERPL-MCNC: 1.15 MG/DL (ref 0.57–1)
EGFRCR SERPLBLD CKD-EPI 2021: 45.6 ML/MIN/1.73
EOSINOPHIL # BLD AUTO: 0.06 10*3/MM3 (ref 0–0.4)
EOSINOPHIL NFR BLD AUTO: 1 % (ref 0.3–6.2)
EPI CELLS #/AREA URNS HPF: NORMAL /HPF (ref 0–10)
ERYTHROCYTE [DISTWIDTH] IN BLOOD BY AUTOMATED COUNT: 13.6 % (ref 12.3–15.4)
GLOBULIN SER CALC-MCNC: 2.4 GM/DL
GLUCOSE SERPL-MCNC: 97 MG/DL (ref 65–99)
GLUCOSE UR QL STRIP: NEGATIVE
HCT VFR BLD AUTO: 41 % (ref 34–46.6)
HGB BLD-MCNC: 13.7 G/DL (ref 12–15.9)
HGB UR QL STRIP: NEGATIVE
IMM GRANULOCYTES # BLD AUTO: 0.01 10*3/MM3 (ref 0–0.05)
IMM GRANULOCYTES NFR BLD AUTO: 0.2 % (ref 0–0.5)
KETONES UR QL STRIP: NEGATIVE
LEUKOCYTE ESTERASE UR QL STRIP: NEGATIVE
LYMPHOCYTES # BLD AUTO: 2.76 10*3/MM3 (ref 0.7–3.1)
LYMPHOCYTES NFR BLD AUTO: 46.8 % (ref 19.6–45.3)
MCH RBC QN AUTO: 29.5 PG (ref 26.6–33)
MCHC RBC AUTO-ENTMCNC: 33.4 G/DL (ref 31.5–35.7)
MCV RBC AUTO: 88.4 FL (ref 79–97)
MICRO URNS: ABNORMAL
MICRO URNS: ABNORMAL
MONOCYTES # BLD AUTO: 0.71 10*3/MM3 (ref 0.1–0.9)
MONOCYTES NFR BLD AUTO: 12 % (ref 5–12)
NEUTROPHILS # BLD AUTO: 2.31 10*3/MM3 (ref 1.7–7)
NEUTROPHILS NFR BLD AUTO: 39.2 % (ref 42.7–76)
NITRITE UR QL STRIP: NEGATIVE
NRBC BLD AUTO-RTO: 0 /100 WBC (ref 0–0.2)
PH UR STRIP: 5 [PH] (ref 5–7.5)
PLATELET # BLD AUTO: 174 10*3/MM3 (ref 140–450)
POTASSIUM SERPL-SCNC: 4.6 MMOL/L (ref 3.5–5.2)
PROT SERPL-MCNC: 7 G/DL (ref 6–8.5)
PROT UR QL STRIP: ABNORMAL
RBC # BLD AUTO: 4.64 10*6/MM3 (ref 3.77–5.28)
RBC #/AREA URNS HPF: NORMAL /HPF (ref 0–2)
SODIUM SERPL-SCNC: 143 MMOL/L (ref 136–145)
SP GR UR STRIP: >=1.03 (ref 1–1.03)
TSH SERPL DL<=0.005 MIU/L-ACNC: 1.66 UIU/ML (ref 0.27–4.2)
URINALYSIS REFLEX: ABNORMAL
UROBILINOGEN UR STRIP-MCNC: 0.2 MG/DL (ref 0.2–1)
WBC # BLD AUTO: 5.9 10*3/MM3 (ref 3.4–10.8)
WBC #/AREA URNS HPF: NORMAL /HPF (ref 0–5)

## 2023-08-23 ENCOUNTER — TELEPHONE (OUTPATIENT)
Dept: INTERNAL MEDICINE | Facility: CLINIC | Age: 88
End: 2023-08-23

## 2023-08-23 NOTE — TELEPHONE ENCOUNTER
PATIENT WAS GIVEN AMIODARONE WHEN SHE HAD HER STINT PUT IN AT THE HOSPITAL. SHE STATES THAT THIS WAS NOT A GOOD MEDICATION FOR HER. PATIENT WOULD LIKE TO KNOW IF HER AMLODIPINE WOULD BE OKAY TO TAKE, OR IF SHE WOULD HAVE A SIMILAR REACTION TO THIS LIKE SHE DID WITH THE AMIODARONE. PLEASE ADVISE PATIENT ASAP. PATIENT HAS NOT TAKEN THIS MEDICATION YET.

## 2023-08-23 NOTE — TELEPHONE ENCOUNTER
Caller: Georgie Isabel LEEANN    Relationship: Self    Best call back number: [phone]    What medications are you currently taking:   Current Outpatient Medications on File Prior to Visit   Medication Sig Dispense Refill    acetaminophen (TYLENOL) 325 MG tablet Take 2 tablets by mouth Every 4 (Four) Hours As Needed for Mild Pain . 100 tablet 3    amLODIPine (NORVASC) 2.5 MG tablet Take 1 tablet by mouth Daily. 90 tablet 1    Biotin 04471 MCG tablet Take 5,000 mcg by mouth Daily.      Cholecalciferol (VITAMIN D) 10 MCG/ML liquid Take 0.5 mL by mouth As Needed.      Eliquis 5 MG tablet tablet TAKE 1 TABLET BY MOUTH EVERY 12 HOURS 180 tablet 3    fish oil-omega-3 fatty acids 1000 MG capsule Take 2 capsules by mouth Daily.      furosemide (LASIX) 20 MG tablet TAKE 1 TABLET BY MOUTH EVERY DAY 90 tablet 1    Multiple Vitamins-Minerals (PRESERVISION AREDS 2 PO) Take  by mouth.      potassium chloride 10 MEQ CR tablet TAKE 1 TABLET BY MOUTH AS NEEDED. TAKE ONLY WHEN TAKING FUROSEMIDE 90 tablet 1     No current facility-administered medications on file prior to visit.          What are your concerns: PATIENT WAS GIVEN AMIODARONE  WHEN SHE HAD HER STINT PUT IN AT THE HOSPITAL. SHE STATES THAT THIS WAS NOT A GOOD MEDICATION FOR HER. PATIENT WOULD LIKE TO KNOW IF HER AMLODIPINE WOULD BE OKAY TO TAKE, OR IF SHE WOULD HAVE A SIMILAR REACTION TO THIS LIKE SHE DID WITH THE AMIODARONE. PLEASE ADVISE PATIENT ASAP. PATIENT HAS NOT TAKEN THIS MEDICATION YET.

## 2023-08-24 ENCOUNTER — TELEPHONE (OUTPATIENT)
Dept: INTERNAL MEDICINE | Facility: CLINIC | Age: 88
End: 2023-08-24
Payer: MEDICARE

## 2023-08-24 NOTE — TELEPHONE ENCOUNTER
"Patient states she can't even remember what the reaction was she said it was 2017. States it just \"did something to the whole system\" she thinks she had vomiting. She just saw the names where similar and thought they were the same.   "

## 2023-09-11 ENCOUNTER — HOSPITAL ENCOUNTER (OUTPATIENT)
Dept: CARDIOLOGY | Facility: HOSPITAL | Age: 88
Discharge: HOME | End: 2023-09-11
Admitting: PHYSICIAN ASSISTANT
Payer: MEDICARE

## 2023-09-11 DIAGNOSIS — I73.00 RAYNAUD'S SYNDROME WITHOUT GANGRENE: ICD-10-CM

## 2023-09-11 DIAGNOSIS — M79.604 LEG PAIN, BILATERAL: ICD-10-CM

## 2023-09-11 DIAGNOSIS — M79.605 LEG PAIN, BILATERAL: ICD-10-CM

## 2023-09-11 DIAGNOSIS — I27.20 PULMONARY HYPERTENSION: Chronic | ICD-10-CM

## 2023-09-11 LAB
BH CV LOWER ARTERIAL LEFT ABI RATIO: 0.98
BH CV LOWER ARTERIAL LEFT DORSALIS PEDIS SYS MAX: 137
BH CV LOWER ARTERIAL LEFT GREAT TOE SYS MAX: NORMAL
BH CV LOWER ARTERIAL LEFT POST TIBIAL SYS MAX: 146
BH CV LOWER ARTERIAL LEFT TBI RATIO: 0.53
BH CV LOWER ARTERIAL RIGHT ABI RATIO: 1.05
BH CV LOWER ARTERIAL RIGHT DORSALIS PEDIS SYS MAX: 150
BH CV LOWER ARTERIAL RIGHT GREAT TOE SYS MAX: 95
BH CV LOWER ARTERIAL RIGHT POST TIBIAL SYS MAX: 159
BH CV LOWER ARTERIAL RIGHT TBI RATIO: 0.63
UPPER ARTERIAL LEFT ARM BRACHIAL SYS MAX: 147
UPPER ARTERIAL RIGHT ARM BRACHIAL SYS MAX: 152

## 2023-09-11 PROCEDURE — 93922 UPR/L XTREMITY ART 2 LEVELS: CPT

## 2023-09-11 PROCEDURE — 93923 UPR/LXTR ART STDY 3+ LVLS: CPT

## 2023-09-13 ENCOUNTER — TELEPHONE (OUTPATIENT)
Dept: INTERNAL MEDICINE | Facility: CLINIC | Age: 88
End: 2023-09-13

## 2023-09-13 NOTE — TELEPHONE ENCOUNTER
Returned patient's call, she wanted to make sure she had the results right. She will talk to Lois at her appointment

## 2023-09-13 NOTE — TELEPHONE ENCOUNTER
PATIENT CALLED BACK IN REGARDS TO ULTRA SOUND OF LEGS. SHE RECEIVED A MESSAGE YESTERDAY.    PLEASE CALL AND ADVISE [phone]

## 2023-10-03 ENCOUNTER — OFFICE VISIT (OUTPATIENT)
Dept: INTERNAL MEDICINE | Facility: CLINIC | Age: 88
End: 2023-10-03
Payer: MEDICARE

## 2023-10-03 VITALS
DIASTOLIC BLOOD PRESSURE: 80 MMHG | WEIGHT: 141 LBS | HEIGHT: 63 IN | BODY MASS INDEX: 24.98 KG/M2 | SYSTOLIC BLOOD PRESSURE: 120 MMHG | TEMPERATURE: 97.8 F

## 2023-10-03 DIAGNOSIS — N18.31 STAGE 3A CHRONIC KIDNEY DISEASE: ICD-10-CM

## 2023-10-03 DIAGNOSIS — I48.21 PERMANENT ATRIAL FIBRILLATION: Chronic | ICD-10-CM

## 2023-10-03 DIAGNOSIS — I83.813 VARICOSE VEINS OF BOTH LOWER EXTREMITIES WITH PAIN: Primary | ICD-10-CM

## 2023-10-03 DIAGNOSIS — I27.20 PULMONARY HYPERTENSION: ICD-10-CM

## 2023-10-03 DIAGNOSIS — I73.00 RAYNAUD'S DISEASE WITHOUT GANGRENE: ICD-10-CM

## 2023-10-03 DIAGNOSIS — E55.9 VITAMIN D DEFICIENCY: ICD-10-CM

## 2023-10-03 PROCEDURE — 99213 OFFICE O/P EST LOW 20 MIN: CPT | Performed by: PHYSICIAN ASSISTANT

## 2023-10-03 NOTE — PROGRESS NOTES
Subjective   Chief Complaint   Patient presents with    Leg Pain     F/U       History of Present Illness     Pt is here today for fup on her extremity pain. She had ABIs that showed mild digital ischemia bilaterally, but otherwise were normal. She continues to have discomfort. She did not start the CCB after a reaction many years ago to a similar medication.      Patient Active Problem List   Diagnosis    Dyspnea on exertion    Mitral regurgitation    Tricuspid regurgitation    Pulmonary hypertension    Permanent atrial fibrillation    Presence of cardiac pacemaker    CKD (chronic kidney disease)    Raynaud's disease       Allergies   Allergen Reactions    Stadol [Butorphanol] Shortness Of Breath       Current Outpatient Medications on File Prior to Visit   Medication Sig Dispense Refill    acetaminophen (TYLENOL) 325 MG tablet Take 2 tablets by mouth Every 4 (Four) Hours As Needed for Mild Pain . 100 tablet 3    Biotin 00318 MCG tablet Take 5,000 mcg by mouth Daily.      Cholecalciferol (VITAMIN D) 10 MCG/ML liquid Take 0.5 mL by mouth As Needed.      Eliquis 5 MG tablet tablet TAKE 1 TABLET BY MOUTH EVERY 12 HOURS 180 tablet 3    fish oil-omega-3 fatty acids 1000 MG capsule Take 2 capsules by mouth Daily.      furosemide (LASIX) 20 MG tablet TAKE 1 TABLET BY MOUTH EVERY DAY 90 tablet 1    Multiple Vitamins-Minerals (PRESERVISION AREDS 2 PO) Take  by mouth.      potassium chloride 10 MEQ CR tablet TAKE 1 TABLET BY MOUTH AS NEEDED. TAKE ONLY WHEN TAKING FUROSEMIDE 90 tablet 1    [DISCONTINUED] amLODIPine (NORVASC) 2.5 MG tablet Take 1 tablet by mouth Daily. (Patient not taking: Reported on 10/3/2023) 90 tablet 1     No current facility-administered medications on file prior to visit.       Past Medical History:   Diagnosis Date    Female bladder prolapse     High cholesterol     Macular degeneration     Status post catheter ablation of atrial flutter 06/14/2019 July 2017    Vaginal prolapse     VHD (valvular  "heart disease)        Family History   Problem Relation Age of Onset    Malig Hyperthermia Neg Hx        Social History     Socioeconomic History    Marital status:    Tobacco Use    Smoking status: Never    Smokeless tobacco: Never   Substance and Sexual Activity    Alcohol use: No    Drug use: No    Sexual activity: Defer       Past Surgical History:   Procedure Laterality Date    CARDIAC ABLATION      FOR A FLUTTER    CARDIAC ELECTROPHYSIOLOGY PROCEDURE N/A 4/21/2021    Procedure: AV node ablation/pacemaker implant (SJM);  Surgeon: Kosta Tadeo MD;  Location: [facility] INVASIVE LOCATION;  Service: Cardiovascular;  Laterality: N/A;    CATARACT EXTRACTION Bilateral     CHOLECYSTECTOMY      D & C HYSTEROSCOPY N/A 6/6/2017    Procedure: DILATATION AND CURETTAGE HYSTEROSCOPY;  Surgeon: Andres Lambert MD;  Location: [facility];  Service:     VAGINAL HYSTERECTOMY N/A 9/27/2018    Procedure: TOTAL VAGINAL HYSTERECTOMY UTEROSACRAL LIGAMENT SUSPENSION;  Surgeon: Andres Lambert MD;  Location: [facility];  Service: Gynecology         The following portions of the patient's history were reviewed and updated as appropriate: problem list, allergies, current medications, past medical history, past family history, past social history, and past surgical history.    ROS    See HPI    Immunization History   Administered Date(s) Administered    Tdap 11/21/2016       Objective   Vitals:    10/03/23 0936   BP: 120/80   Temp: 97.8 °F (36.6 °C)   Weight: 64 kg (141 lb)   Height: 160 cm (62.99\")     Body mass index is 24.98 kg/m².  Physical Exam  Vitals reviewed.   Constitutional:       Appearance: Normal appearance.   HENT:      Head: Normocephalic and atraumatic.   Cardiovascular:      Rate and Rhythm: Normal rate and regular rhythm.   Musculoskeletal:      Comments: Tortuous varicose veins bilateral lower extremities.    Neurological:      Mental Status: She is alert.         Assessment & Plan   Diagnoses " and all orders for this visit:    1. Varicose veins of both lower extremities with pain (Primary)  -     Ambulatory Referral to Vascular Surgery    2. Raynaud's disease without gangrene  -     Ambulatory Referral to Vascular Surgery    3. Vitamin D deficiency  -     Vitamin D,25-Hydroxy; Future    4. Pulmonary hypertension  -     Comprehensive Metabolic Panel; Future  -     Lipid Panel With / Chol / HDL Ratio; Future    5. Permanent atrial fibrillation  -     Comprehensive Metabolic Panel; Future  -     Lipid Panel With / Chol / HDL Ratio; Future    6. Stage 3a chronic kidney disease  -     Lipid Panel With / Chol / HDL Ratio; Future     I will refer her to Dr. Goff for her varicose veins and raynaud's. I suspect her Varicose veins are causing the majority of her sx.     Return in about 1 year (around 10/3/2024) for Medicare Wellness, Lab Appt Before FUP.

## 2024-03-04 ENCOUNTER — OFFICE VISIT (OUTPATIENT)
Dept: INTERNAL MEDICINE | Facility: CLINIC | Age: 89
End: 2024-03-04
Payer: MEDICARE

## 2024-03-04 VITALS
WEIGHT: 140 LBS | TEMPERATURE: 98.2 F | DIASTOLIC BLOOD PRESSURE: 80 MMHG | BODY MASS INDEX: 24.8 KG/M2 | HEIGHT: 63 IN | SYSTOLIC BLOOD PRESSURE: 126 MMHG

## 2024-03-04 DIAGNOSIS — B02.9 HERPES ZOSTER WITHOUT COMPLICATION: ICD-10-CM

## 2024-03-04 DIAGNOSIS — H61.21 IMPACTED CERUMEN OF RIGHT EAR: Primary | ICD-10-CM

## 2024-03-04 PROCEDURE — 99213 OFFICE O/P EST LOW 20 MIN: CPT | Performed by: PHYSICIAN ASSISTANT

## 2024-03-04 RX ORDER — VALACYCLOVIR HYDROCHLORIDE 1 G/1
1000 TABLET, FILM COATED ORAL 3 TIMES DAILY
Qty: 30 TABLET | Refills: 0 | Status: SHIPPED | OUTPATIENT
Start: 2024-03-04 | End: 2024-03-11 | Stop reason: SDUPTHER

## 2024-03-04 RX ORDER — GABAPENTIN 100 MG/1
100 CAPSULE ORAL 2 TIMES DAILY
Qty: 30 CAPSULE | Refills: 0 | Status: SHIPPED | OUTPATIENT
Start: 2024-03-04 | End: 2024-03-11

## 2024-03-04 NOTE — PROGRESS NOTES
Subjective   Chief Complaint   Patient presents with    Earache     right    Shoulder Pain     Right after picking up a skillet        History of Present Illness     Pt is here today with right shoulder pain x a few days. She states it started after getting a skillet out. Has pain radiating down her right arm and into the right side of her neck and ear. She has a rash also that started over the weekend down her entire right arm.  Does not itch. She has been taking tylenol for the pain, it is not helping.     Patient Active Problem List   Diagnosis    Dyspnea on exertion    Mitral regurgitation    Tricuspid regurgitation    Pulmonary hypertension    Permanent atrial fibrillation    Presence of cardiac pacemaker    CKD (chronic kidney disease)    Raynaud's disease       Allergies   Allergen Reactions    Stadol [Butorphanol] Shortness Of Breath       Current Outpatient Medications on File Prior to Visit   Medication Sig Dispense Refill    acetaminophen (TYLENOL) 325 MG tablet Take 2 tablets by mouth Every 4 (Four) Hours As Needed for Mild Pain . 100 tablet 3    Biotin 62334 MCG tablet Take 5,000 mcg by mouth Daily.      Cholecalciferol (VITAMIN D) 10 MCG/ML liquid Take 0.5 mL by mouth As Needed.      Eliquis 5 MG tablet tablet TAKE 1 TABLET BY MOUTH EVERY 12 HOURS 180 tablet 3    fish oil-omega-3 fatty acids 1000 MG capsule Take 2 capsules by mouth Daily.      furosemide (LASIX) 20 MG tablet TAKE 1 TABLET BY MOUTH EVERY DAY 90 tablet 1    Multiple Vitamins-Minerals (PRESERVISION AREDS 2 PO) Take  by mouth.      potassium chloride 10 MEQ CR tablet TAKE 1 TABLET BY MOUTH AS NEEDED. TAKE ONLY WHEN TAKING FUROSEMIDE 90 tablet 1     No current facility-administered medications on file prior to visit.       Past Medical History:   Diagnosis Date    Female bladder prolapse     High cholesterol     Macular degeneration     Status post catheter ablation of atrial flutter 06/14/2019 July 2017    Vaginal prolapse     D  "(valvular heart disease)        Family History   Problem Relation Age of Onset    Malig Hyperthermia Neg Hx        Social History     Socioeconomic History    Marital status:    Tobacco Use    Smoking status: Never    Smokeless tobacco: Never   Substance and Sexual Activity    Alcohol use: No    Drug use: No    Sexual activity: Defer       Past Surgical History:   Procedure Laterality Date    CARDIAC ABLATION      FOR A FLUTTER    CARDIAC ELECTROPHYSIOLOGY PROCEDURE N/A 4/21/2021    Procedure: AV node ablation/pacemaker implant (SJM);  Surgeon: Kosta Tadeo MD;  Location: [facility] INVASIVE LOCATION;  Service: Cardiovascular;  Laterality: N/A;    CATARACT EXTRACTION Bilateral     CHOLECYSTECTOMY      D & C HYSTEROSCOPY N/A 6/6/2017    Procedure: DILATATION AND CURETTAGE HYSTEROSCOPY;  Surgeon: Andres Lambert MD;  Location: [facility];  Service:     VAGINAL HYSTERECTOMY N/A 9/27/2018    Procedure: TOTAL VAGINAL HYSTERECTOMY UTEROSACRAL LIGAMENT SUSPENSION;  Surgeon: Andres Lambert MD;  Location: [facility];  Service: Gynecology         The following portions of the patient's history were reviewed and updated as appropriate: problem list, allergies, current medications, past medical history, past family history, past social history, and past surgical history.    ROS    See HPI    Immunization History   Administered Date(s) Administered    Tdap 11/21/2016       Objective   Vitals:    03/04/24 0927   BP: 126/80   Temp: 98.2 °F (36.8 °C)   Weight: 63.5 kg (140 lb)   Height: 160 cm (62.99\")     Body mass index is 24.81 kg/m².  Physical Exam  Vitals reviewed.   Constitutional:       Appearance: Normal appearance.   HENT:      Head: Normocephalic and atraumatic.      Ears:      Comments: Cerumen impaction on the right  Cardiovascular:      Rate and Rhythm: Normal rate and regular rhythm.   Skin:     Comments: Erythematous rash down entire right arm starting at the shoulder with scattered " patches of vesicles.    Neurological:      Mental Status: She is alert.           Assessment & Plan   Diagnoses and all orders for this visit:    1. Impacted cerumen of right ear (Primary)  -     Ambulatory Referral to ENT (Otolaryngology)    2. Herpes zoster without complication  -     valACYclovir (Valtrex) 1000 MG tablet; Take 1 tablet by mouth 3 (Three) Times a Day for 10 days.  Dispense: 30 tablet; Refill: 0  -     gabapentin (NEURONTIN) 100 MG capsule; Take 1 capsule by mouth 2 (Two) Times a Day.  Dispense: 30 capsule; Refill: 0     Will txt with antivirals and low dose gabapentin. Shoulder/neck pain is from the shingles infection. Referral to ENT for cerumen impaction.     No follow-ups on file.

## 2024-03-07 ENCOUNTER — TELEPHONE (OUTPATIENT)
Dept: INTERNAL MEDICINE | Facility: CLINIC | Age: 89
End: 2024-03-07
Payer: MEDICARE

## 2024-03-07 DIAGNOSIS — B02.9 HERPES ZOSTER WITHOUT COMPLICATION: ICD-10-CM

## 2024-03-07 NOTE — TELEPHONE ENCOUNTER
Spoke with PT she will take 2 100mg at a time, told her I would send this message to Lois and if wanted  anything more they would let her know on Friday

## 2024-03-07 NOTE — TELEPHONE ENCOUNTER
Tell her to double up on the gabapentin- looks like she's only getting 100 mg at a time.  Offer appt with Lois tomorrow or just send this to her after you talk to patient.

## 2024-03-07 NOTE — TELEPHONE ENCOUNTER
PATIENT CALLED IN REGARDS TO MEDICATION     gabapentin (NEURONTIN) 100 MG capsule     SHE STATES THE MEDICATION IS NOT HELPING WITH HER PAIN FROM THE SHINGLES.     PLEASE CALL AND ADVISE [phone]    [facility]/pharmacy #[id number] - [place], [place] - [address] - [phone]  - [phone]  [phone]

## 2024-03-08 NOTE — TELEPHONE ENCOUNTER
Can she spread out the 2 tablets and take each with tylenol so she is not taking too much. Per Lois patient can take 2 tablets up to 3 times a day

## 2024-03-11 ENCOUNTER — TELEPHONE (OUTPATIENT)
Dept: INTERNAL MEDICINE | Facility: CLINIC | Age: 89
End: 2024-03-11
Payer: MEDICARE

## 2024-03-11 DIAGNOSIS — B02.9 HERPES ZOSTER WITHOUT COMPLICATION: ICD-10-CM

## 2024-03-11 DIAGNOSIS — B02.9 HERPES ZOSTER WITHOUT COMPLICATION: Primary | ICD-10-CM

## 2024-03-11 RX ORDER — VALACYCLOVIR HYDROCHLORIDE 1 G/1
1000 TABLET, FILM COATED ORAL 3 TIMES DAILY
Qty: 30 TABLET | Refills: 0 | Status: SHIPPED | OUTPATIENT
Start: 2024-03-11 | End: 2024-03-21

## 2024-03-11 RX ORDER — GABAPENTIN 300 MG/1
300 CAPSULE ORAL 3 TIMES DAILY
Qty: 60 CAPSULE | Refills: 0 | Status: SHIPPED | OUTPATIENT
Start: 2024-03-11

## 2024-03-11 NOTE — TELEPHONE ENCOUNTER
Caller: Isabel Jacques    Relationship: Self    Best call back number:[phone]     Requested Prescriptions:   Requested Prescriptions     Pending Prescriptions Disp Refills    valACYclovir (Valtrex) 1000 MG tablet 30 tablet 0     Sig: Take 1 tablet by mouth 3 (Three) Times a Day for 10 days.        Pharmacy where request should be sent: [facility]/PHARMACY #[id number] - [address] [phone] [facility] [phone]      Last office visit with prescribing clinician: 3/4/2024   Last telemedicine visit with prescribing clinician: Visit date not found   Next office visit with prescribing clinician: Visit date not found     Additional details provided by patient: PATIENT HAS 10 LEFT    Does the patient have less than a 3 day supply:  [] Yes  [x] No    Would you like a call back once the refill request has been completed: [] Yes [x] No    If the office needs to give you a call back, can they leave a voicemail: [] Yes [x] No    Edwardo Hanson Rep   03/11/24 09:49 EDT

## 2024-03-11 NOTE — TELEPHONE ENCOUNTER
Caller: Isabel Jacques    Relationship: Self    Best call back number:     [phone]       What medication are you requesting: GABAPENTIN //STRONGER DOSAGE    What are your current symptoms: SHINGLES    Have you had these symptoms before:    [x] Yes  [] No    Have you been treated for these symptoms before:   [x] Yes  [] No    If a prescription is needed, what is your preferred pharmacy and phone number: [facility]/PHARMACY #[id number] - [place], [place] - [address] [phone] [facility] [phone] [facility]     Additional notes:     PATIENT IS NEEDING A STRONGER DOSAGE.   PATIENT IS NOW OUT OF IT.   PLEASE ADVISE.

## 2024-03-18 ENCOUNTER — TELEPHONE (OUTPATIENT)
Dept: INTERNAL MEDICINE | Facility: CLINIC | Age: 89
End: 2024-03-18
Payer: MEDICARE

## 2024-03-18 NOTE — TELEPHONE ENCOUNTER
Patient called wanting to ask Lois about medication she has for Shingles. Patient says that the pain medicine is not really helping. Said that the spots are going away put pain is constant. Call back # [phone]

## 2024-03-19 ENCOUNTER — OFFICE VISIT (OUTPATIENT)
Dept: INTERNAL MEDICINE | Facility: CLINIC | Age: 89
End: 2024-03-19
Payer: MEDICARE

## 2024-03-19 VITALS
BODY MASS INDEX: 24.8 KG/M2 | WEIGHT: 140 LBS | SYSTOLIC BLOOD PRESSURE: 126 MMHG | DIASTOLIC BLOOD PRESSURE: 74 MMHG | TEMPERATURE: 97.8 F | HEIGHT: 63 IN

## 2024-03-19 DIAGNOSIS — B02.29 POSTHERPETIC NEURALGIA: Primary | ICD-10-CM

## 2024-03-19 PROCEDURE — 99213 OFFICE O/P EST LOW 20 MIN: CPT | Performed by: PHYSICIAN ASSISTANT

## 2024-03-19 NOTE — PROGRESS NOTES
Subjective   Chief Complaint   Patient presents with    Arm Pain     From shingles, gabapentin not helping       History of Present Illness      Here today for fup on her shingles. Rash is almost resolved, still having pain. She is taking Gabapentin 300 mg TID, has had no side effects. Still having break through pain.     Patient Active Problem List   Diagnosis    Dyspnea on exertion    Mitral regurgitation    Tricuspid regurgitation    Pulmonary hypertension    Permanent atrial fibrillation    Presence of cardiac pacemaker    CKD (chronic kidney disease)    Raynaud's disease       Allergies   Allergen Reactions    Stadol [Butorphanol] Shortness Of Breath       Current Outpatient Medications on File Prior to Visit   Medication Sig Dispense Refill    acetaminophen (TYLENOL) 325 MG tablet Take 2 tablets by mouth Every 4 (Four) Hours As Needed for Mild Pain . 100 tablet 3    Biotin 99319 MCG tablet Take 5,000 mcg by mouth Daily.      Cholecalciferol (VITAMIN D) 10 MCG/ML liquid Take 0.5 mL by mouth As Needed.      Eliquis 5 MG tablet tablet TAKE 1 TABLET BY MOUTH EVERY 12 HOURS 180 tablet 3    fish oil-omega-3 fatty acids 1000 MG capsule Take 2 capsules by mouth Daily.      furosemide (LASIX) 20 MG tablet TAKE 1 TABLET BY MOUTH EVERY DAY 90 tablet 1    gabapentin (NEURONTIN) 300 MG capsule Take 1 capsule by mouth 3 (Three) Times a Day. 60 capsule 0    Multiple Vitamins-Minerals (PRESERVISION AREDS 2 PO) Take  by mouth.      potassium chloride 10 MEQ CR tablet TAKE 1 TABLET BY MOUTH AS NEEDED. TAKE ONLY WHEN TAKING FUROSEMIDE 90 tablet 1    valACYclovir (Valtrex) 1000 MG tablet Take 1 tablet by mouth 3 (Three) Times a Day for 10 days. 30 tablet 0     No current facility-administered medications on file prior to visit.       Past Medical History:   Diagnosis Date    Female bladder prolapse     High cholesterol     Macular degeneration     Status post catheter ablation of atrial flutter 06/14/2019 July 2017    Vaginal  "prolapse     VHD (valvular heart disease)        Family History   Problem Relation Age of Onset    Malig Hyperthermia Neg Hx        Social History     Socioeconomic History    Marital status:    Tobacco Use    Smoking status: Never    Smokeless tobacco: Never   Substance and Sexual Activity    Alcohol use: No    Drug use: No    Sexual activity: Defer       Past Surgical History:   Procedure Laterality Date    CARDIAC ABLATION      FOR A FLUTTER    CARDIAC ELECTROPHYSIOLOGY PROCEDURE N/A 4/21/2021    Procedure: AV node ablation/pacemaker implant (SJM);  Surgeon: Kosta Tadeo MD;  Location: [facility] INVASIVE LOCATION;  Service: Cardiovascular;  Laterality: N/A;    CATARACT EXTRACTION Bilateral     CHOLECYSTECTOMY      D & C HYSTEROSCOPY N/A 6/6/2017    Procedure: DILATATION AND CURETTAGE HYSTEROSCOPY;  Surgeon: Andres Lambert MD;  Location: [facility];  Service:     VAGINAL HYSTERECTOMY N/A 9/27/2018    Procedure: TOTAL VAGINAL HYSTERECTOMY UTEROSACRAL LIGAMENT SUSPENSION;  Surgeon: Andres Lambert MD;  Location: [facility];  Service: Gynecology         The following portions of the patient's history were reviewed and updated as appropriate: problem list, allergies, current medications, past medical history, past family history, past social history, and past surgical history.    ROS    See HPI    Immunization History   Administered Date(s) Administered    Tdap 11/21/2016       Objective   Vitals:    03/19/24 1044   BP: 126/74   Temp: 97.8 °F (36.6 °C)   Weight: 63.5 kg (140 lb)   Height: 160 cm (62.99\")     Body mass index is 24.81 kg/m².  Physical Exam  Vitals reviewed.   Constitutional:       Appearance: Normal appearance.   HENT:      Head: Normocephalic and atraumatic.   Cardiovascular:      Rate and Rhythm: Normal rate and regular rhythm.   Neurological:      Mental Status: She is alert.           Assessment & Plan   Diagnoses and all orders for this visit:    1. Postherpetic " neuralgia (Primary)     Will increase her Gabapentin to 600 mg TID. She will call in 3 days, if no improvement will try switching her to Lyrica.     No follow-ups on file.

## 2024-10-28 ENCOUNTER — OFFICE VISIT (OUTPATIENT)
Dept: INTERNAL MEDICINE | Facility: CLINIC | Age: 89
End: 2024-10-28
Payer: MEDICARE

## 2024-10-28 VITALS
HEIGHT: 63 IN | WEIGHT: 139 LBS | BODY MASS INDEX: 24.63 KG/M2 | SYSTOLIC BLOOD PRESSURE: 130 MMHG | TEMPERATURE: 98.2 F | DIASTOLIC BLOOD PRESSURE: 80 MMHG

## 2024-10-28 DIAGNOSIS — Z79.01 ANTICOAGULATED: ICD-10-CM

## 2024-10-28 DIAGNOSIS — B02.29 POSTHERPETIC NEURALGIA: ICD-10-CM

## 2024-10-28 DIAGNOSIS — E55.9 VITAMIN D DEFICIENCY: ICD-10-CM

## 2024-10-28 DIAGNOSIS — I27.20 PULMONARY HYPERTENSION: Chronic | ICD-10-CM

## 2024-10-28 DIAGNOSIS — Z00.00 MEDICARE ANNUAL WELLNESS VISIT, SUBSEQUENT: Primary | ICD-10-CM

## 2024-10-28 DIAGNOSIS — N18.9 CHRONIC KIDNEY DISEASE, UNSPECIFIED CKD STAGE: Chronic | ICD-10-CM

## 2024-10-28 DIAGNOSIS — Z13.220 SCREENING, LIPID: ICD-10-CM

## 2024-10-28 PROCEDURE — 1170F FXNL STATUS ASSESSED: CPT | Performed by: PHYSICIAN ASSISTANT

## 2024-10-28 PROCEDURE — 1126F AMNT PAIN NOTED NONE PRSNT: CPT | Performed by: PHYSICIAN ASSISTANT

## 2024-10-28 PROCEDURE — 1160F RVW MEDS BY RX/DR IN RCRD: CPT | Performed by: PHYSICIAN ASSISTANT

## 2024-10-28 PROCEDURE — G0439 PPPS, SUBSEQ VISIT: HCPCS | Performed by: PHYSICIAN ASSISTANT

## 2024-10-28 PROCEDURE — 1159F MED LIST DOCD IN RCRD: CPT | Performed by: PHYSICIAN ASSISTANT

## 2024-10-28 NOTE — PROGRESS NOTES
Subjective   The ABCs of the Annual Wellness Visit  Medicare Wellness Visit      Isabel Jacques is a 89 y.o. patient who presents for a Medicare Wellness Visit.    The following portions of the patient's history were reviewed and   updated as appropriate: allergies, current medications, past family history, past medical history, past social history, past surgical history, and problem list.    Compared to one year ago, the patient's physical   health is the same.  Compared to one year ago, the patient's mental   health is the same.    Recent Hospitalizations:  She was not admitted to the hospital during the last year.     Current Medical Providers:  Patient Care Team:  Lois Romero PA-C as PCP - General (Physician Assistant)  Michaela Heaton MD as Consulting Physician (Cardiology)    Outpatient Medications Prior to Visit   Medication Sig Dispense Refill    acetaminophen (TYLENOL) 325 MG tablet Take 2 tablets by mouth Every 4 (Four) Hours As Needed for Mild Pain . 100 tablet 3    Biotin 31408 MCG tablet Take 5,000 mcg by mouth Daily.      Cholecalciferol (VITAMIN D) 10 MCG/ML liquid Take 0.5 mL by mouth As Needed.      Eliquis 5 MG tablet tablet TAKE 1 TABLET BY MOUTH EVERY 12 HOURS 180 tablet 3    fish oil-omega-3 fatty acids 1000 MG capsule Take 2 capsules by mouth Daily.      furosemide (LASIX) 20 MG tablet TAKE 1 TABLET BY MOUTH EVERY DAY 90 tablet 1    gabapentin (NEURONTIN) 300 MG capsule Take 1 capsule by mouth 3 (Three) Times a Day. 60 capsule 0    Multiple Vitamins-Minerals (PRESERVISION AREDS 2 PO) Take  by mouth.      potassium chloride 10 MEQ CR tablet TAKE 1 TABLET BY MOUTH AS NEEDED. TAKE ONLY WHEN TAKING FUROSEMIDE 90 tablet 1     No facility-administered medications prior to visit.     No opioid medication identified on active medication list. I have reviewed chart for other potential  high risk medication/s and harmful drug interactions in the elderly.      Aspirin is not on active medication  "list.  Aspirin use is not indicated based on review of current medical condition/s. Risk of harm outweighs potential benefits.  .    Patient Active Problem List   Diagnosis    Dyspnea on exertion    Mitral regurgitation    Tricuspid regurgitation    Pulmonary hypertension    Permanent atrial fibrillation    Presence of cardiac pacemaker    CKD (chronic kidney disease)    Raynaud's disease     Advance Care Planning Advance Directive is on file.  ACP discussion was held with the patient during this visit. Patient has an advance directive in EMR which is still valid.             Objective   Vitals:    10/28/24 1452   Temp: 98.2 °F (36.8 °C)   Weight: 63 kg (139 lb)   Height: 160 cm (62.99\")       Estimated body mass index is 24.63 kg/m² as calculated from the following:    Height as of this encounter: 160 cm (62.99\").    Weight as of this encounter: 63 kg (139 lb).    BMI is within normal parameters. No other follow-up for BMI required.       Does the patient have evidence of cognitive impairment? No                                                                                                Health  Risk Assessment    Smoking Status:  Social History     Tobacco Use   Smoking Status Never   Smokeless Tobacco Never     Alcohol Consumption:  Social History     Substance and Sexual Activity   Alcohol Use No       Fall Risk Screen  STEADI Fall Risk Assessment was completed, and patient is at LOW risk for falls.Assessment completed on:10/28/2024    Depression Screening:      10/28/2024     2:55 PM   PHQ-2/PHQ-9 Depression Screening   Little interest or pleasure in doing things Not at all   Feeling down, depressed, or hopeless Not at all     Health Habits and Functional and Cognitive Screening:      10/28/2024     2:54 PM   Functional & Cognitive Status   Do you have difficulty preparing food and eating? No   Do you have difficulty bathing yourself, getting dressed or grooming yourself? No   Do you have difficulty using the " toilet? No   Do you have difficulty moving around from place to place? No   Do you have trouble with steps or getting out of a bed or a chair? No   Current Diet Well Balanced Diet   Dental Exam Up to date   Eye Exam Up to date   Exercise (times per week) 0 times per week   Current Exercises Include No Regular Exercise   Do you need help using the phone?  No   Are you deaf or do you have serious difficulty hearing?  No   Do you need help to go to places out of walking distance? No   Do you need help shopping? No   Do you need help preparing meals?  No   Do you need help with housework?  No   Do you need help with laundry? No   Do you need help taking your medications? No   Do you need help managing money? No   Do you ever drive or ride in a car without wearing a seat belt? No   Have you felt unusual stress, anger or loneliness in the last month? No   Who do you live with? Alone   If you need help, do you have trouble finding someone available to you? No   Have you been bothered in the last four weeks by sexual problems? No   Do you have difficulty concentrating, remembering or making decisions? No           Age-appropriate Screening Schedule:  Refer to the list below for future screening recommendations based on patient's age, sex and/or medical conditions. Orders for these recommended tests are listed in the plan section. The patient has been provided with a written plan.    Health Maintenance List  Health Maintenance   Topic Date Due    ZOSTER VACCINE (1 of 2) Never done    Pneumococcal Vaccine 65+ (1 of 1 - PCV) Never done    RSV Vaccine - Adults (1 - 1-dose 75+ series) Never done    DXA SCAN  05/04/2020    ANNUAL WELLNESS VISIT  07/26/2020    LIPID PANEL  04/08/2023    INFLUENZA VACCINE  08/01/2024    COVID-19 Vaccine (1 - 2023-24 season) Never done    TDAP/TD VACCINES (2 - Td or Tdap) 11/21/2026                                                                                                                         "                        CMS Preventative Services Quick Reference  Risk Factors Identified During Encounter  None Identified    The above risks/problems have been discussed with the patient.  Pertinent information has been shared with the patient in the After Visit Summary.  An After Visit Summary and PPPS were made available to the patient.    Follow Up:   Next Medicare Wellness visit to be scheduled in 1 year.         Additional E&M Note during same encounter follows:  Patient has additional, significant, and separately identifiable condition(s)/problem(s) that require work above and beyond the Medicare Wellness Visit     Chief Complaint  Medicare Wellness-subsequent    Subjective   HPI          Pt is here today for medicare wellness exam. No cp or SOA. Under some increased stress today, but overall doing well. Declines vaccinations.         Objective   Vital Signs:  Temp 98.2 °F (36.8 °C)   Ht 160 cm (62.99\")   Wt 63 kg (139 lb)   BMI 24.63 kg/m²   Physical Exam  Vitals reviewed.   Constitutional:       Appearance: She is well-developed.   HENT:      Head: Normocephalic and atraumatic.   Cardiovascular:      Rate and Rhythm: Normal rate and regular rhythm.      Heart sounds: Normal heart sounds, S1 normal and S2 normal.   Pulmonary:      Effort: Pulmonary effort is normal.      Breath sounds: Normal breath sounds.   Skin:     General: Skin is warm.   Neurological:      Mental Status: She is alert.   Psychiatric:         Behavior: Behavior normal.               Assessment and Plan               Medicare annual wellness visit, subsequent    Chronic kidney disease, unspecified CKD stage    Pulmonary hypertension    Postherpetic neuralgia    Vitamin D deficiency    Screening, lipid    Anticoagulated    Diagnoses and all orders for this visit:    1. Medicare annual wellness visit, subsequent (Primary)    2. Chronic kidney disease, unspecified CKD stage  Assessment & Plan:        3. Pulmonary " hypertension  Overview:  July 2018: RVSP 40 mmHg      Sees cardio for chronic cardiac conditions. BP is good today. Declines vaccinations. Will get labs today as well.            Follow Up   No follow-ups on file.  Patient was given instructions and counseling regarding her condition or for health maintenance advice. Please see specific information pulled into the AVS if appropriate.

## 2024-10-29 LAB
25(OH)D3+25(OH)D2 SERPL-MCNC: 106 NG/ML (ref 30–100)
ALBUMIN SERPL-MCNC: 4.3 G/DL (ref 3.5–5.2)
ALBUMIN/GLOB SERPL: 1.5 G/DL
ALP SERPL-CCNC: 83 U/L (ref 39–117)
ALT SERPL-CCNC: 27 U/L (ref 1–33)
AST SERPL-CCNC: 26 U/L (ref 1–32)
BASOPHILS # BLD AUTO: 0.02 10*3/MM3 (ref 0–0.2)
BASOPHILS NFR BLD AUTO: 0.4 % (ref 0–1.5)
BILIRUB SERPL-MCNC: 0.6 MG/DL (ref 0–1.2)
BUN SERPL-MCNC: 18 MG/DL (ref 8–23)
BUN/CREAT SERPL: 17 (ref 7–25)
CALCIUM SERPL-MCNC: 9.5 MG/DL (ref 8.2–9.6)
CHLORIDE SERPL-SCNC: 104 MMOL/L (ref 98–107)
CO2 SERPL-SCNC: 27.4 MMOL/L (ref 22–29)
CREAT SERPL-MCNC: 1.06 MG/DL (ref 0.57–1)
EGFRCR SERPLBLD CKD-EPI 2021: 50 ML/MIN/1.73
EOSINOPHIL # BLD AUTO: 0.06 10*3/MM3 (ref 0–0.4)
EOSINOPHIL NFR BLD AUTO: 1.1 % (ref 0.3–6.2)
ERYTHROCYTE [DISTWIDTH] IN BLOOD BY AUTOMATED COUNT: 13.2 % (ref 12.3–15.4)
GLOBULIN SER CALC-MCNC: 2.9 GM/DL
GLUCOSE SERPL-MCNC: 97 MG/DL (ref 65–99)
HCT VFR BLD AUTO: 42.1 % (ref 34–46.6)
HGB BLD-MCNC: 13.8 G/DL (ref 12–15.9)
IMM GRANULOCYTES # BLD AUTO: 0.01 10*3/MM3 (ref 0–0.05)
IMM GRANULOCYTES NFR BLD AUTO: 0.2 % (ref 0–0.5)
LYMPHOCYTES # BLD AUTO: 1.91 10*3/MM3 (ref 0.7–3.1)
LYMPHOCYTES NFR BLD AUTO: 33.9 % (ref 19.6–45.3)
MCH RBC QN AUTO: 29.6 PG (ref 26.6–33)
MCHC RBC AUTO-ENTMCNC: 32.8 G/DL (ref 31.5–35.7)
MCV RBC AUTO: 90.1 FL (ref 79–97)
MONOCYTES # BLD AUTO: 0.59 10*3/MM3 (ref 0.1–0.9)
MONOCYTES NFR BLD AUTO: 10.5 % (ref 5–12)
NEUTROPHILS # BLD AUTO: 3.05 10*3/MM3 (ref 1.7–7)
NEUTROPHILS NFR BLD AUTO: 53.9 % (ref 42.7–76)
NRBC BLD AUTO-RTO: 0 /100 WBC (ref 0–0.2)
PLATELET # BLD AUTO: 180 10*3/MM3 (ref 140–450)
POTASSIUM SERPL-SCNC: 4.2 MMOL/L (ref 3.5–5.2)
PROT SERPL-MCNC: 7.2 G/DL (ref 6–8.5)
RBC # BLD AUTO: 4.67 10*6/MM3 (ref 3.77–5.28)
SODIUM SERPL-SCNC: 142 MMOL/L (ref 136–145)
WBC # BLD AUTO: 5.64 10*3/MM3 (ref 3.4–10.8)

## (undated) DEVICE — LOU PACE DEFIB: Brand: MEDLINE INDUSTRIES, INC.

## (undated) DEVICE — ANTIBACTERIAL UNDYED BRAIDED (POLYGLACTIN 910), SYNTHETIC ABSORBABLE SUTURE: Brand: COATED VICRYL

## (undated) DEVICE — SYR LUERLOK 50ML

## (undated) DEVICE — SOL IRR NACL 0.9PCT BT 1000ML

## (undated) DEVICE — SUT VIC 1 CT1 36IN J947H

## (undated) DEVICE — ST IRR CYSTO W/SPK 77IN LF

## (undated) DEVICE — SPNG LAP 18X18IN LF STRL PK/5

## (undated) DEVICE — INTENDED FOR TISSUE SEPARATION, AND OTHER PROCEDURES THAT REQUIRE A SHARP SURGICAL BLADE TO PUNCTURE OR CUT.: Brand: BARD-PARKER ® CARBON RIB-BACK BLADES

## (undated) DEVICE — COVER,MAYO STAND,STERILE: Brand: MEDLINE

## (undated) DEVICE — SUT VIC 0 CT1 36IN J946H

## (undated) DEVICE — STRAP STIRUP SLP RNG 19X3.5IN DISP

## (undated) DEVICE — SUT VIC 0 TIES 18IN J912G

## (undated) DEVICE — CATH ABL SAFIRE BIDIR UNIV MD CRV 7F 8MM 2-5-2MM 110CM

## (undated) DEVICE — PK HYST VAG 40

## (undated) DEVICE — PENCL E/S HNDSWCH ROCKR CB

## (undated) DEVICE — INTRO SHEATH PRELUDE SNAP .038 6F 13CM W/SDPRT

## (undated) DEVICE — SUT MNCRYL PLS ANTIB UD 4/0 PS2 18IN

## (undated) DEVICE — PINNACLE INTRODUCER SHEATH: Brand: PINNACLE

## (undated) DEVICE — ENCORE® LATEX ORTHO SIZE 8, STERILE LATEX POWDER-FREE SURGICAL GLOVE: Brand: ENCORE

## (undated) DEVICE — LOU D & C HYSTEROSCOPY: Brand: MEDLINE INDUSTRIES, INC.

## (undated) DEVICE — DRAPE,UNDERBUTTOCKS,PCH,STERILE: Brand: MEDLINE

## (undated) DEVICE — CATH FOL SIMPLYLATEX SILELAST 16F 17IN

## (undated) DEVICE — DECANT BG O JET

## (undated) DEVICE — SUT VIC 2/0 CT2 27IN J269H

## (undated) DEVICE — PAD SANI MAXI W/ADHS SNG WRP 11IN

## (undated) DEVICE — PDS II VLT 0 107CM AG ST3: Brand: ENDOLOOP

## (undated) DEVICE — NDL SPINE 18G 31/2IN PNK